# Patient Record
Sex: FEMALE | Race: OTHER | Employment: UNEMPLOYED | URBAN - METROPOLITAN AREA
[De-identification: names, ages, dates, MRNs, and addresses within clinical notes are randomized per-mention and may not be internally consistent; named-entity substitution may affect disease eponyms.]

---
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
## 2020-08-27 ENCOUNTER — OFFICE VISIT (OUTPATIENT)
Dept: FAMILY MEDICINE CLINIC | Facility: CLINIC | Age: 20
End: 2020-08-27
Payer: COMMERCIAL

## 2020-08-27 VITALS
RESPIRATION RATE: 18 BRPM | BODY MASS INDEX: 27.82 KG/M2 | TEMPERATURE: 98 F | WEIGHT: 167 LBS | HEART RATE: 78 BPM | HEIGHT: 65 IN | SYSTOLIC BLOOD PRESSURE: 104 MMHG | DIASTOLIC BLOOD PRESSURE: 76 MMHG

## 2020-08-27 DIAGNOSIS — F90.2 ATTENTION DEFICIT HYPERACTIVITY DISORDER (ADHD), COMBINED TYPE: ICD-10-CM

## 2020-08-27 DIAGNOSIS — J45.40 MODERATE PERSISTENT ASTHMA WITHOUT COMPLICATION: ICD-10-CM

## 2020-08-27 DIAGNOSIS — Z00.00 ROUTINE GENERAL MEDICAL EXAMINATION AT A HEALTH CARE FACILITY: Primary | ICD-10-CM

## 2020-08-27 DIAGNOSIS — N94.6 SEVERE MENSTRUAL CRAMPS: ICD-10-CM

## 2020-08-27 DIAGNOSIS — F41.9 ANXIETY: ICD-10-CM

## 2020-08-27 DIAGNOSIS — J30.2 SEASONAL ALLERGIC RHINITIS, UNSPECIFIED TRIGGER: ICD-10-CM

## 2020-08-27 PROBLEM — Z22.7 TB LUNG, LATENT: Status: ACTIVE | Noted: 2018-07-30

## 2020-08-27 PROBLEM — R00.2 PALPITATIONS: Status: RESOLVED | Noted: 2017-06-19 | Resolved: 2020-08-27

## 2020-08-27 PROBLEM — J38.3 VOCAL CORD DYSFUNCTION: Status: RESOLVED | Noted: 2017-05-25 | Resolved: 2020-08-27

## 2020-08-27 PROBLEM — Z87.820 HISTORY OF MULTIPLE CONCUSSIONS: Status: ACTIVE | Noted: 2018-03-25

## 2020-08-27 PROBLEM — Z86.15 PERSONAL HISTORY OF LATENT TUBERCULOSIS INFECTION: Status: ACTIVE | Noted: 2018-07-30

## 2020-08-27 PROCEDURE — 3008F BODY MASS INDEX DOCD: CPT | Performed by: PHYSICIAN ASSISTANT

## 2020-08-27 PROCEDURE — 3078F DIAST BP <80 MM HG: CPT | Performed by: PHYSICIAN ASSISTANT

## 2020-08-27 PROCEDURE — 3074F SYST BP LT 130 MM HG: CPT | Performed by: PHYSICIAN ASSISTANT

## 2020-08-27 PROCEDURE — 99385 PREV VISIT NEW AGE 18-39: CPT | Performed by: PHYSICIAN ASSISTANT

## 2020-08-27 RX ORDER — DEXTROAMPHETAMINE SACCHARATE, AMPHETAMINE ASPARTATE MONOHYDRATE, DEXTROAMPHETAMINE SULFATE AND AMPHETAMINE SULFATE 1.25; 1.25; 1.25; 1.25 MG/1; MG/1; MG/1; MG/1
5 CAPSULE, EXTENDED RELEASE ORAL DAILY
Qty: 30 CAPSULE | Refills: 0 | Status: SHIPPED | OUTPATIENT
Start: 2020-10-28 | End: 2020-11-27

## 2020-08-27 RX ORDER — DEXTROAMPHETAMINE SACCHARATE, AMPHETAMINE ASPARTATE MONOHYDRATE, DEXTROAMPHETAMINE SULFATE AND AMPHETAMINE SULFATE 1.25; 1.25; 1.25; 1.25 MG/1; MG/1; MG/1; MG/1
5 CAPSULE, EXTENDED RELEASE ORAL DAILY
Qty: 30 CAPSULE | Refills: 0 | Status: SHIPPED | OUTPATIENT
Start: 2020-08-27 | End: 2020-09-26

## 2020-08-27 RX ORDER — ALBUTEROL SULFATE 90 UG/1
2 AEROSOL, METERED RESPIRATORY (INHALATION)
Qty: 3 INHALER | Refills: 3 | Status: SHIPPED | OUTPATIENT
Start: 2020-08-27 | End: 2021-06-02

## 2020-08-27 RX ORDER — DEXTROAMPHETAMINE SACCHARATE, AMPHETAMINE ASPARTATE MONOHYDRATE, DEXTROAMPHETAMINE SULFATE AND AMPHETAMINE SULFATE 7.5; 7.5; 7.5; 7.5 MG/1; MG/1; MG/1; MG/1
30 CAPSULE, EXTENDED RELEASE ORAL DAILY
Qty: 30 CAPSULE | Refills: 0 | Status: SHIPPED | OUTPATIENT
Start: 2020-08-27 | End: 2020-09-26

## 2020-08-27 RX ORDER — DEXTROAMPHETAMINE SULFATE, DEXTROAMPHETAMINE SACCHARATE, AMPHETAMINE SULFATE AND AMPHETAMINE ASPARTATE 1.25; 1.25; 1.25; 1.25 MG/1; MG/1; MG/1; MG/1
CAPSULE, EXTENDED RELEASE ORAL
COMMUNITY
Start: 2020-03-19 | End: 2020-08-27

## 2020-08-27 RX ORDER — DEXTROAMPHETAMINE SACCHARATE, AMPHETAMINE ASPARTATE MONOHYDRATE, DEXTROAMPHETAMINE SULFATE AND AMPHETAMINE SULFATE 7.5; 7.5; 7.5; 7.5 MG/1; MG/1; MG/1; MG/1
30 CAPSULE, EXTENDED RELEASE ORAL DAILY
Qty: 30 CAPSULE | Refills: 0 | Status: SHIPPED | OUTPATIENT
Start: 2020-10-28 | End: 2020-11-27

## 2020-08-27 RX ORDER — FEXOFENADINE HCL 180 MG/1
180 TABLET ORAL DAILY
COMMUNITY

## 2020-08-27 RX ORDER — DEXTROAMPHETAMINE SACCHARATE, AMPHETAMINE ASPARTATE MONOHYDRATE, DEXTROAMPHETAMINE SULFATE AND AMPHETAMINE SULFATE 7.5; 7.5; 7.5; 7.5 MG/1; MG/1; MG/1; MG/1
CAPSULE, EXTENDED RELEASE ORAL
COMMUNITY
Start: 2020-03-19 | End: 2020-08-27

## 2020-08-27 RX ORDER — DEXTROAMPHETAMINE SACCHARATE, AMPHETAMINE ASPARTATE MONOHYDRATE, DEXTROAMPHETAMINE SULFATE AND AMPHETAMINE SULFATE 1.25; 1.25; 1.25; 1.25 MG/1; MG/1; MG/1; MG/1
5 CAPSULE, EXTENDED RELEASE ORAL DAILY
Qty: 30 CAPSULE | Refills: 0 | Status: SHIPPED | OUTPATIENT
Start: 2020-09-27 | End: 2020-10-27

## 2020-08-27 RX ORDER — DEXTROAMPHETAMINE SACCHARATE, AMPHETAMINE ASPARTATE MONOHYDRATE, DEXTROAMPHETAMINE SULFATE AND AMPHETAMINE SULFATE 7.5; 7.5; 7.5; 7.5 MG/1; MG/1; MG/1; MG/1
30 CAPSULE, EXTENDED RELEASE ORAL DAILY
Qty: 30 CAPSULE | Refills: 0 | Status: SHIPPED | OUTPATIENT
Start: 2020-09-27 | End: 2020-10-27

## 2020-08-27 NOTE — PROGRESS NOTES
HPI:    Patient ID: Leanna Duncan is a 20year old female. HPI   Patient presents today to establish care and requests physical exam. Was previously receiving care at [facility].       PMHx: problem list reviewed and reconciled  PSHx: none  FHx: reviewed mouth daily. 30 capsule 0   • [START ON 9/27/2020] Amphetamine-Dextroamphet ER (ADDERALL XR) 30 MG Oral Capsule SR 24 Hr Take 1 capsule (30 mg total) by mouth daily.  30 capsule 0   • [START ON 10/28/2020] Amphetamine-Dextroamphet ER (ADDERALL XR) 30 MG Ora Left Ear: Tympanic membrane and external ear normal.   Nose: Nose normal.   Mouth/Throat: Oropharynx is clear and moist.   Post nasal drip   Eyes: Pupils are equal, round, and reactive to light.  Conjunctivae and EOM are normal.   Neck: Normal range of mo (ADDERALL XR) 30 MG Oral Capsule SR 24 Hr; Take 1 capsule (30 mg total) by mouth daily. Dispense: 30 capsule; Refill: 0  - Amphetamine-Dextroamphet ER (ADDERALL XR) 5 MG Oral Capsule SR 24 Hr; Take 1 capsule (5 mg total) by mouth daily.   Dispense: 30 caps [E]      Meds This Visit:  Requested Prescriptions     Signed Prescriptions Disp Refills   • Amphetamine-Dextroamphet ER (ADDERALL XR) 30 MG Oral Capsule SR 24 Hr 30 capsule 0     Sig: Take 1 capsule (30 mg total) by mouth daily.    • Amphetamine-Dextroamph

## 2020-09-02 ENCOUNTER — LAB ENCOUNTER (OUTPATIENT)
Dept: LAB | Age: 20
End: 2020-09-02
Attending: PHYSICIAN ASSISTANT
Payer: COMMERCIAL

## 2020-09-02 DIAGNOSIS — Z00.00 ROUTINE GENERAL MEDICAL EXAMINATION AT A HEALTH CARE FACILITY: ICD-10-CM

## 2020-09-02 LAB
ALBUMIN SERPL-MCNC: 4.5 G/DL (ref 3.4–5)
ALBUMIN/GLOB SERPL: 1.3 {RATIO} (ref 1–2)
ALP LIVER SERPL-CCNC: 49 U/L (ref 52–144)
ALT SERPL-CCNC: 15 U/L (ref 13–56)
ANION GAP SERPL CALC-SCNC: 9 MMOL/L (ref 0–18)
AST SERPL-CCNC: 15 U/L (ref 15–37)
BASOPHILS # BLD AUTO: 0.03 X10(3) UL (ref 0–0.2)
BASOPHILS NFR BLD AUTO: 0.5 %
BILIRUB SERPL-MCNC: 0.8 MG/DL (ref 0.1–2)
BUN BLD-MCNC: 12 MG/DL (ref 7–18)
BUN/CREAT SERPL: 12.4 (ref 10–20)
CALCIUM BLD-MCNC: 9.8 MG/DL (ref 8.5–10.1)
CHLORIDE SERPL-SCNC: 106 MMOL/L (ref 98–112)
CHOLEST SMN-MCNC: 192 MG/DL (ref ?–200)
CO2 SERPL-SCNC: 22 MMOL/L (ref 21–32)
CREAT BLD-MCNC: 0.97 MG/DL (ref 0.55–1.02)
DEPRECATED RDW RBC AUTO: 45.7 FL (ref 35.1–46.3)
EOSINOPHIL # BLD AUTO: 0.09 X10(3) UL (ref 0–0.7)
EOSINOPHIL NFR BLD AUTO: 1.5 %
ERYTHROCYTE [DISTWIDTH] IN BLOOD BY AUTOMATED COUNT: 13.3 % (ref 11–15)
GLOBULIN PLAS-MCNC: 3.5 G/DL (ref 2.8–4.4)
GLUCOSE BLD-MCNC: 88 MG/DL (ref 70–99)
HCT VFR BLD AUTO: 43.6 % (ref 35–48)
HDLC SERPL-MCNC: 72 MG/DL (ref 40–59)
HGB BLD-MCNC: 13.8 G/DL (ref 12–16)
IMM GRANULOCYTES # BLD AUTO: 0.01 X10(3) UL (ref 0–1)
IMM GRANULOCYTES NFR BLD: 0.2 %
LDLC SERPL CALC-MCNC: 106 MG/DL (ref ?–100)
LYMPHOCYTES # BLD AUTO: 1.33 X10(3) UL (ref 1–4)
LYMPHOCYTES NFR BLD AUTO: 22.5 %
M PROTEIN MFR SERPL ELPH: 8 G/DL (ref 6.4–8.2)
MCH RBC QN AUTO: 29.1 PG (ref 26–34)
MCHC RBC AUTO-ENTMCNC: 31.7 G/DL (ref 31–37)
MCV RBC AUTO: 91.8 FL (ref 80–100)
MONOCYTES # BLD AUTO: 0.48 X10(3) UL (ref 0.1–1)
MONOCYTES NFR BLD AUTO: 8.1 %
NEUTROPHILS # BLD AUTO: 3.98 X10 (3) UL (ref 1.5–7.7)
NEUTROPHILS # BLD AUTO: 3.98 X10(3) UL (ref 1.5–7.7)
NEUTROPHILS NFR BLD AUTO: 67.2 %
NONHDLC SERPL-MCNC: 120 MG/DL (ref ?–130)
OSMOLALITY SERPL CALC.SUM OF ELEC: 283 MOSM/KG (ref 275–295)
PATIENT FASTING Y/N/NP: YES
PATIENT FASTING Y/N/NP: YES
PLATELET # BLD AUTO: 257 10(3)UL (ref 150–450)
POTASSIUM SERPL-SCNC: 4.3 MMOL/L (ref 3.5–5.1)
RBC # BLD AUTO: 4.75 X10(6)UL (ref 3.8–5.3)
SARS-COV-2 IGG SERPLBLD QL IA.RAPID: NEGATIVE
SODIUM SERPL-SCNC: 137 MMOL/L (ref 136–145)
TRIGL SERPL-MCNC: 68 MG/DL (ref 30–149)
TSI SER-ACNC: 1.45 MIU/ML (ref 0.36–3.74)
VIT B12 SERPL-MCNC: 384 PG/ML (ref 193–986)
VIT D+METAB SERPL-MCNC: 9 NG/ML (ref 30–100)
VLDLC SERPL CALC-MCNC: 14 MG/DL (ref 0–30)
WBC # BLD AUTO: 5.9 X10(3) UL (ref 4–11)

## 2020-09-02 PROCEDURE — 86769 SARS-COV-2 COVID-19 ANTIBODY: CPT | Performed by: PHYSICIAN ASSISTANT

## 2020-09-02 PROCEDURE — 82306 VITAMIN D 25 HYDROXY: CPT | Performed by: PHYSICIAN ASSISTANT

## 2020-09-02 PROCEDURE — 80061 LIPID PANEL: CPT | Performed by: PHYSICIAN ASSISTANT

## 2020-09-02 PROCEDURE — 80050 GENERAL HEALTH PANEL: CPT | Performed by: PHYSICIAN ASSISTANT

## 2020-09-02 PROCEDURE — 82607 VITAMIN B-12: CPT | Performed by: PHYSICIAN ASSISTANT

## 2020-09-02 PROCEDURE — 36415 COLL VENOUS BLD VENIPUNCTURE: CPT | Performed by: PHYSICIAN ASSISTANT

## 2020-10-04 ENCOUNTER — HOSPITAL ENCOUNTER (OUTPATIENT)
Age: 20
Discharge: HOME | End: 2020-10-04
Payer: COMMERCIAL

## 2020-10-04 ENCOUNTER — APPOINTMENT (OUTPATIENT)
Dept: GENERAL RADIOLOGY | Age: 20
End: 2020-10-04
Attending: PHYSICIAN ASSISTANT
Payer: COMMERCIAL

## 2020-10-04 VITALS
DIASTOLIC BLOOD PRESSURE: 74 MMHG | HEIGHT: 67 IN | HEART RATE: 74 BPM | WEIGHT: 160 LBS | BODY MASS INDEX: 25.11 KG/M2 | RESPIRATION RATE: 16 BRPM | OXYGEN SATURATION: 96 % | SYSTOLIC BLOOD PRESSURE: 113 MMHG | TEMPERATURE: 98 F

## 2020-10-04 DIAGNOSIS — B34.9 VIRAL SYNDROME: Primary | ICD-10-CM

## 2020-10-04 PROCEDURE — 71046 X-RAY EXAM CHEST 2 VIEWS: CPT | Performed by: PHYSICIAN ASSISTANT

## 2020-10-04 PROCEDURE — 99213 OFFICE O/P EST LOW 20 MIN: CPT | Performed by: PHYSICIAN ASSISTANT

## 2020-10-04 PROCEDURE — U0003 INFECTIOUS AGENT DETECTION BY NUCLEIC ACID (DNA OR RNA); SEVERE ACUTE RESPIRATORY SYNDROME CORONAVIRUS 2 (SARS-COV-2) (CORONAVIRUS DISEASE [COVID-19]), AMPLIFIED PROBE TECHNIQUE, MAKING USE OF HIGH THROUGHPUT TECHNOLOGIES AS DESCRIBED BY CMS-2020-01-R: HCPCS | Performed by: PHYSICIAN ASSISTANT

## 2020-10-04 RX ORDER — MELATONIN 10 MG
CAPSULE ORAL
COMMUNITY

## 2020-10-04 NOTE — ED PROVIDER NOTES
Patient Seen in: [facility]      History   Patient presents with:  Testing    Stated Complaint: TL - cough, diarrhea, congested, fever; hx of asthma X4 days    HPI    CHIEF COMPLAINT: Cough, congestion and diarrhea for the Alcohol/week: 0.0 standard drinks    Drug use: No             Review of Systems    Positive for stated complaint: TL - cough, diarrhea, congested, fever; hx of asthma X4 days  Other systems are as noted in HPI. Constitutional and vital signs reviewed. XR CHEST PA + LAT CHEST (CPT=71046)  INDICATIONS:  TL - cough, diarrhea, congested, fever; hx of asthma X4 days  COMPARISON:  [facility] , XR, XR CHEST PA + LAT CHEST (OBR=92114), 5/25/2013, 10:09 AM.  TECHNIQUE:  PA and lateral chest radiographs were obtained

## 2020-10-04 NOTE — ED INITIAL ASSESSMENT (HPI)
For the past 4 days, c/o dry cough/wheezing, diarrhea (once per day), sinus congestion, and low grade fevers. Using Albuterol MDI with minimal relief.

## 2020-10-09 ENCOUNTER — APPOINTMENT (OUTPATIENT)
Dept: GENERAL RADIOLOGY | Age: 20
End: 2020-10-09
Attending: NURSE PRACTITIONER
Payer: COMMERCIAL

## 2020-10-09 ENCOUNTER — HOSPITAL ENCOUNTER (OUTPATIENT)
Age: 20
Discharge: HOME | End: 2020-10-09
Payer: COMMERCIAL

## 2020-10-09 VITALS
HEIGHT: 67 IN | RESPIRATION RATE: 18 BRPM | BODY MASS INDEX: 25.11 KG/M2 | HEART RATE: 72 BPM | DIASTOLIC BLOOD PRESSURE: 61 MMHG | WEIGHT: 160 LBS | TEMPERATURE: 99 F | SYSTOLIC BLOOD PRESSURE: 102 MMHG | OXYGEN SATURATION: 98 %

## 2020-10-09 DIAGNOSIS — S29.011A STRAIN OF CHEST WALL, INITIAL ENCOUNTER: ICD-10-CM

## 2020-10-09 DIAGNOSIS — S46.911A SHOULDER STRAIN, RIGHT, INITIAL ENCOUNTER: Primary | ICD-10-CM

## 2020-10-09 PROCEDURE — 99213 OFFICE O/P EST LOW 20 MIN: CPT | Performed by: NURSE PRACTITIONER

## 2020-10-09 PROCEDURE — 73030 X-RAY EXAM OF SHOULDER: CPT | Performed by: NURSE PRACTITIONER

## 2020-10-09 PROCEDURE — 73000 X-RAY EXAM OF COLLAR BONE: CPT | Performed by: NURSE PRACTITIONER

## 2020-10-09 NOTE — ED PROVIDER NOTES
Patient Seen in: [facility]      History   Patient presents with:  Chest Pain Angina    Stated Complaint: chest and arm pain x 3days    79-year-old female with no with history of asthma presents the immediate care with rig Constitutional:       Appearance: Normal appearance. HENT:      Head: Normocephalic. Nose: Nose normal.      Mouth/Throat:      Mouth: Mucous membranes are moist.   Eyes:      Extraocular Movements: Extraocular movements intact.    Cardiovascular: [phone]                Medications Prescribed:  Discharge Medication List as of 10/9/2020  7:00 PM

## 2020-10-27 ENCOUNTER — NURSE ONLY (OUTPATIENT)
Dept: FAMILY MEDICINE CLINIC | Facility: CLINIC | Age: 20
End: 2020-10-27
Payer: COMMERCIAL

## 2020-10-27 PROCEDURE — 90686 IIV4 VACC NO PRSV 0.5 ML IM: CPT | Performed by: FAMILY MEDICINE

## 2020-10-27 PROCEDURE — 90471 IMMUNIZATION ADMIN: CPT | Performed by: FAMILY MEDICINE

## 2020-11-06 ENCOUNTER — OFFICE VISIT (OUTPATIENT)
Dept: FAMILY MEDICINE CLINIC | Facility: CLINIC | Age: 20
End: 2020-11-06
Payer: COMMERCIAL

## 2020-11-06 VITALS
HEIGHT: 67 IN | OXYGEN SATURATION: 98 % | DIASTOLIC BLOOD PRESSURE: 82 MMHG | WEIGHT: 165 LBS | TEMPERATURE: 98 F | SYSTOLIC BLOOD PRESSURE: 120 MMHG | RESPIRATION RATE: 18 BRPM | HEART RATE: 102 BPM | BODY MASS INDEX: 25.9 KG/M2

## 2020-11-06 DIAGNOSIS — R22.0 SWELLING OF UPPER LIP: Primary | ICD-10-CM

## 2020-11-06 PROCEDURE — 3074F SYST BP LT 130 MM HG: CPT | Performed by: NURSE PRACTITIONER

## 2020-11-06 PROCEDURE — 99212 OFFICE O/P EST SF 10 MIN: CPT | Performed by: NURSE PRACTITIONER

## 2020-11-06 PROCEDURE — 99072 ADDL SUPL MATRL&STAF TM PHE: CPT | Performed by: NURSE PRACTITIONER

## 2020-11-06 PROCEDURE — 3079F DIAST BP 80-89 MM HG: CPT | Performed by: NURSE PRACTITIONER

## 2020-11-06 PROCEDURE — 3008F BODY MASS INDEX DOCD: CPT | Performed by: NURSE PRACTITIONER

## 2020-11-06 NOTE — PROGRESS NOTES
Patient presents with:  Mouth/Lip Problem: swollen top lip, dental appt yesterday       HPI:    Greg Johns is a 23year old adult presents with inflammation, slight erythema, and small umbilicated vesicles with tenderness to palpation over upper lip. Date   • ASTHMA    • ECZEMA         No past surgical history on file.    Family History   Problem Relation Age of Onset   • Asthma Other    • Diabetes Maternal Grandmother    • High Cholesterol Maternal Grandmother    • Depression Maternal Grandmother    • prescriptions requested or ordered in this encounter       Imaging & Consults:  None    Skin care d/w the pt. Continue with Dentist recommendations to use Allegra, ice packs, Ibuprofen PRN, keeping lips moist with Vaseline.   The patient indicates [place]

## 2020-11-06 NOTE — PATIENT INSTRUCTIONS
Take Ibuprofen as needed for pain and swelling. Per patient's dentist, continue with Allegra, ice packs and keep lip lubricated with Vaseline.

## 2021-03-08 ENCOUNTER — OFFICE VISIT (OUTPATIENT)
Dept: FAMILY MEDICINE CLINIC | Facility: CLINIC | Age: 21
End: 2021-03-08
Payer: COMMERCIAL

## 2021-03-08 VITALS
DIASTOLIC BLOOD PRESSURE: 76 MMHG | RESPIRATION RATE: 16 BRPM | WEIGHT: 180 LBS | HEART RATE: 74 BPM | HEIGHT: 67 IN | BODY MASS INDEX: 28.25 KG/M2 | OXYGEN SATURATION: 98 % | SYSTOLIC BLOOD PRESSURE: 116 MMHG

## 2021-03-08 DIAGNOSIS — L21.9 SEBORRHEIC DERMATITIS: Primary | ICD-10-CM

## 2021-03-08 PROCEDURE — 3074F SYST BP LT 130 MM HG: CPT | Performed by: PHYSICIAN ASSISTANT

## 2021-03-08 PROCEDURE — 3008F BODY MASS INDEX DOCD: CPT | Performed by: PHYSICIAN ASSISTANT

## 2021-03-08 PROCEDURE — 3078F DIAST BP <80 MM HG: CPT | Performed by: PHYSICIAN ASSISTANT

## 2021-03-08 PROCEDURE — 99213 OFFICE O/P EST LOW 20 MIN: CPT | Performed by: PHYSICIAN ASSISTANT

## 2021-03-08 RX ORDER — EPINEPHRINE 0.3 MG/.3ML
INJECTION SUBCUTANEOUS
COMMUNITY
Start: 2021-02-17

## 2021-03-08 NOTE — PROGRESS NOTES
HPI/Subjective:   Patient ID: Sybil Abrams is a 23year old adult. HPI   Patient presents today for evaluation of the skin of the left upper eyelid. For the last few days there has been a patch of erythema, dryness, and cracking.   She has been applyin for Wheezing. 3 Inhaler 3   • Diclofenac Sodium 50 MG Oral Tab EC Take 1 tablet (50 mg total) by mouth 2 (two) times daily.  30 tablet 3   • albuterol sulfate (2.5 MG/3ML) 0.083% Inhalation Nebu Soln Take 3 mL (2.5 mg total) by nebulization every 4 (four) h types were placed in this encounter.       Meds This Visit:  Requested Prescriptions      No prescriptions requested or ordered in this encounter       Imaging & Referrals:  None

## 2021-05-04 ENCOUNTER — HOSPITAL ENCOUNTER (EMERGENCY)
Facility: HOSPITAL | Age: 21
Discharge: HOME | End: 2021-05-05
Attending: EMERGENCY MEDICINE
Payer: COMMERCIAL

## 2021-05-04 DIAGNOSIS — T78.40XA ALLERGIC REACTION, INITIAL ENCOUNTER: Primary | ICD-10-CM

## 2021-05-04 PROCEDURE — S0028 INJECTION, FAMOTIDINE, 20 MG: HCPCS | Performed by: EMERGENCY MEDICINE

## 2021-05-04 PROCEDURE — 99284 EMERGENCY DEPT VISIT MOD MDM: CPT

## 2021-05-04 PROCEDURE — 96374 THER/PROPH/DIAG INJ IV PUSH: CPT

## 2021-05-04 PROCEDURE — 96375 TX/PRO/DX INJ NEW DRUG ADDON: CPT

## 2021-05-04 RX ORDER — FAMOTIDINE 10 MG/ML
20 INJECTION, SOLUTION INTRAVENOUS ONCE
Status: COMPLETED | OUTPATIENT
Start: 2021-05-04 | End: 2021-05-04

## 2021-05-04 RX ORDER — METHYLPREDNISOLONE SODIUM SUCCINATE 125 MG/2ML
125 INJECTION, POWDER, LYOPHILIZED, FOR SOLUTION INTRAMUSCULAR; INTRAVENOUS ONCE
Status: COMPLETED | OUTPATIENT
Start: 2021-05-04 | End: 2021-05-04

## 2021-05-04 RX ORDER — DIPHENHYDRAMINE HYDROCHLORIDE 50 MG/ML
50 INJECTION INTRAMUSCULAR; INTRAVENOUS ONCE
Status: COMPLETED | OUTPATIENT
Start: 2021-05-04 | End: 2021-05-04

## 2021-05-05 ENCOUNTER — LAB ENCOUNTER (OUTPATIENT)
Dept: LAB | Age: 21
End: 2021-05-05
Attending: PHYSICIAN ASSISTANT
Payer: COMMERCIAL

## 2021-05-05 ENCOUNTER — OFFICE VISIT (OUTPATIENT)
Dept: FAMILY MEDICINE CLINIC | Facility: CLINIC | Age: 21
End: 2021-05-05
Payer: COMMERCIAL

## 2021-05-05 ENCOUNTER — HOSPITAL ENCOUNTER (EMERGENCY)
Facility: HOSPITAL | Age: 21
Discharge: HOME | End: 2021-05-06
Attending: EMERGENCY MEDICINE
Payer: COMMERCIAL

## 2021-05-05 ENCOUNTER — APPOINTMENT (OUTPATIENT)
Dept: GENERAL RADIOLOGY | Facility: HOSPITAL | Age: 21
End: 2021-05-05
Attending: EMERGENCY MEDICINE
Payer: COMMERCIAL

## 2021-05-05 VITALS
SYSTOLIC BLOOD PRESSURE: 124 MMHG | OXYGEN SATURATION: 99 % | HEART RATE: 82 BPM | DIASTOLIC BLOOD PRESSURE: 71 MMHG | WEIGHT: 168 LBS | RESPIRATION RATE: 19 BRPM | BODY MASS INDEX: 26.37 KG/M2 | TEMPERATURE: 98 F | HEIGHT: 67 IN

## 2021-05-05 VITALS
HEIGHT: 67 IN | RESPIRATION RATE: 18 BRPM | SYSTOLIC BLOOD PRESSURE: 114 MMHG | BODY MASS INDEX: 27.31 KG/M2 | OXYGEN SATURATION: 97 % | HEART RATE: 80 BPM | WEIGHT: 174 LBS | DIASTOLIC BLOOD PRESSURE: 68 MMHG | TEMPERATURE: 98 F

## 2021-05-05 DIAGNOSIS — E55.9 VITAMIN D DEFICIENCY: ICD-10-CM

## 2021-05-05 DIAGNOSIS — R07.89 CHEST PAIN, ATYPICAL: Primary | ICD-10-CM

## 2021-05-05 DIAGNOSIS — T78.40XD ALLERGIC REACTION, SUBSEQUENT ENCOUNTER: Primary | ICD-10-CM

## 2021-05-05 PROCEDURE — 82306 VITAMIN D 25 HYDROXY: CPT | Performed by: PHYSICIAN ASSISTANT

## 2021-05-05 PROCEDURE — 3074F SYST BP LT 130 MM HG: CPT | Performed by: PHYSICIAN ASSISTANT

## 2021-05-05 PROCEDURE — 80053 COMPREHEN METABOLIC PANEL: CPT | Performed by: EMERGENCY MEDICINE

## 2021-05-05 PROCEDURE — 93010 ELECTROCARDIOGRAM REPORT: CPT

## 2021-05-05 PROCEDURE — 36415 COLL VENOUS BLD VENIPUNCTURE: CPT

## 2021-05-05 PROCEDURE — 99284 EMERGENCY DEPT VISIT MOD MDM: CPT

## 2021-05-05 PROCEDURE — 85379 FIBRIN DEGRADATION QUANT: CPT | Performed by: EMERGENCY MEDICINE

## 2021-05-05 PROCEDURE — 84484 ASSAY OF TROPONIN QUANT: CPT | Performed by: EMERGENCY MEDICINE

## 2021-05-05 PROCEDURE — 71045 X-RAY EXAM CHEST 1 VIEW: CPT | Performed by: EMERGENCY MEDICINE

## 2021-05-05 PROCEDURE — 3008F BODY MASS INDEX DOCD: CPT | Performed by: PHYSICIAN ASSISTANT

## 2021-05-05 PROCEDURE — 85025 COMPLETE CBC W/AUTO DIFF WBC: CPT | Performed by: PHYSICIAN ASSISTANT

## 2021-05-05 PROCEDURE — 85025 COMPLETE CBC W/AUTO DIFF WBC: CPT | Performed by: EMERGENCY MEDICINE

## 2021-05-05 PROCEDURE — 3078F DIAST BP <80 MM HG: CPT | Performed by: PHYSICIAN ASSISTANT

## 2021-05-05 PROCEDURE — 93005 ELECTROCARDIOGRAM TRACING: CPT

## 2021-05-05 PROCEDURE — 99214 OFFICE O/P EST MOD 30 MIN: CPT | Performed by: PHYSICIAN ASSISTANT

## 2021-05-05 RX ORDER — PREDNISONE 20 MG/1
40 TABLET ORAL DAILY
Qty: 10 TABLET | Refills: 0 | Status: SHIPPED | OUTPATIENT
Start: 2021-05-05 | End: 2021-05-10

## 2021-05-06 VITALS
BODY MASS INDEX: 27.94 KG/M2 | HEIGHT: 67 IN | SYSTOLIC BLOOD PRESSURE: 108 MMHG | WEIGHT: 178 LBS | DIASTOLIC BLOOD PRESSURE: 74 MMHG | OXYGEN SATURATION: 98 % | TEMPERATURE: 98 F | HEART RATE: 84 BPM | RESPIRATION RATE: 23 BRPM

## 2021-05-06 NOTE — ED INITIAL ASSESSMENT (HPI)
Pt presents to ED for chest pressure generalized chest area and pain to mid sternum also pressure to all limbs. Pt states was in ED for allergic reaction yesterday. Denies JUS.

## 2021-05-06 NOTE — ED PROVIDER NOTES
Patient Seen in: [facility] Emergency Department      History   Patient presents with:  Pain    Stated Complaint: Was here last night for allergic reaction and now having weird sensation in her*    HPI/Subjective:   HPI    Patient is a 13-year-old f None (Room air)       Current:/74   Pulse 84   Temp 98.2 °F (36.8 °C) (Temporal)   Resp 23   Ht 170.2 cm (5' 7\")   Wt 80.7 kg   LMP 05/04/2021   SpO2 98%   BMI 27.88 kg/m²         Physical Exam    GENERAL: No acute distress, well appearing and non-t DIFFERENTIAL[[id number]]          Abnormal            Final result                 Please view results for these tests on the individual orders.    RAINBOW DRAW LAVENDER   RAINBOW DRAW LIGHT GREEN   RAINBOW DRAW GOLD     EKG    Rate, intervals and axes as no follow-up with her primary physician. EKG without acute changes. Normal troponin and D-dimer. Chest x-ray shows no evidence for pneumothorax or pneumonia. EKG without signs of pericarditis. Patient was screened and evaluated during this visit.    As

## 2021-05-09 ENCOUNTER — PATIENT MESSAGE (OUTPATIENT)
Dept: FAMILY MEDICINE CLINIC | Facility: CLINIC | Age: 21
End: 2021-05-09

## 2021-05-10 ENCOUNTER — PATIENT MESSAGE (OUTPATIENT)
Dept: FAMILY MEDICINE CLINIC | Facility: CLINIC | Age: 21
End: 2021-05-10

## 2021-05-10 ENCOUNTER — OFFICE VISIT (OUTPATIENT)
Dept: FAMILY MEDICINE CLINIC | Facility: CLINIC | Age: 21
End: 2021-05-10
Payer: COMMERCIAL

## 2021-05-10 ENCOUNTER — LAB ENCOUNTER (OUTPATIENT)
Dept: LAB | Age: 21
End: 2021-05-10
Attending: PHYSICIAN ASSISTANT
Payer: COMMERCIAL

## 2021-05-10 VITALS
DIASTOLIC BLOOD PRESSURE: 90 MMHG | HEART RATE: 78 BPM | OXYGEN SATURATION: 98 % | HEIGHT: 67 IN | BODY MASS INDEX: 27.94 KG/M2 | SYSTOLIC BLOOD PRESSURE: 112 MMHG | WEIGHT: 178 LBS | RESPIRATION RATE: 16 BRPM

## 2021-05-10 DIAGNOSIS — R59.9 GLANDS SWOLLEN: ICD-10-CM

## 2021-05-10 DIAGNOSIS — R06.02 SHORTNESS OF BREATH: ICD-10-CM

## 2021-05-10 DIAGNOSIS — M54.2 NECK PAIN: ICD-10-CM

## 2021-05-10 DIAGNOSIS — Z20.822 SUSPECTED COVID-19 VIRUS INFECTION: ICD-10-CM

## 2021-05-10 DIAGNOSIS — R07.89 CHEST WALL TENDERNESS: ICD-10-CM

## 2021-05-10 DIAGNOSIS — R07.9 CHEST PAIN, UNSPECIFIED TYPE: ICD-10-CM

## 2021-05-10 DIAGNOSIS — R07.9 CHEST PAIN, UNSPECIFIED TYPE: Primary | ICD-10-CM

## 2021-05-10 DIAGNOSIS — R19.7 DIARRHEA, UNSPECIFIED TYPE: ICD-10-CM

## 2021-05-10 PROCEDURE — 3080F DIAST BP >= 90 MM HG: CPT | Performed by: PHYSICIAN ASSISTANT

## 2021-05-10 PROCEDURE — 3074F SYST BP LT 130 MM HG: CPT | Performed by: PHYSICIAN ASSISTANT

## 2021-05-10 PROCEDURE — 99214 OFFICE O/P EST MOD 30 MIN: CPT | Performed by: PHYSICIAN ASSISTANT

## 2021-05-10 PROCEDURE — 3008F BODY MASS INDEX DOCD: CPT | Performed by: PHYSICIAN ASSISTANT

## 2021-05-10 NOTE — TELEPHONE ENCOUNTER
Spoke to pt she states pain from rib cage up to neck. Pt seen in ER last week for different sx. Pt states it hurts to touch and painful to swallow. Pt requesting to see LR today advised no available appointments. Pt verbalized understanding and states she will

## 2021-05-10 NOTE — PROGRESS NOTES
HPI/Subjective:   Patient ID: Sd Guo is a 23year old adult. HPI   Patient presents accompanied by her mother in follow up to last visit on 5/5/2021. At that time she was seen for f/u of presumed allergic reaction.   She was continued on prednison Musculoskeletal: Positive for back pain and myalgias. Negative for arthralgias and gait problem. Skin: Negative for rash. Neurological: Negative for weakness, light-headedness and headaches. Hematological: Positive for adenopathy.    Psychiatric/Beh lungs daily. 1 each 11   • Mometasone Furoate (ELOCON) 0.1 % External Cream Apply sparingly to affected areas twice daily 45 g 2   • predniSONE 20 MG Oral Tab Take 2 tablets (40 mg total) by mouth daily for 5 days.  (Patient not taking: Reported on 5/10/202 supple. Lymphadenopathy:      Head:      Right side of head: Submandibular adenopathy present. Cervical: Cervical adenopathy present. Left cervical: Posterior cervical adenopathy present. Skin:     General: Skin is warm and dry.       [place]illar

## 2021-06-18 ENCOUNTER — HOSPITAL ENCOUNTER (EMERGENCY)
Facility: HOSPITAL | Age: 21
Discharge: HOME | End: 2021-06-19
Attending: EMERGENCY MEDICINE
Payer: OTHER MISCELLANEOUS

## 2021-06-18 VITALS
OXYGEN SATURATION: 100 % | SYSTOLIC BLOOD PRESSURE: 124 MMHG | HEIGHT: 67 IN | HEART RATE: 98 BPM | DIASTOLIC BLOOD PRESSURE: 90 MMHG | TEMPERATURE: 99 F | BODY MASS INDEX: 26.68 KG/M2 | RESPIRATION RATE: 16 BRPM | WEIGHT: 170 LBS

## 2021-06-18 DIAGNOSIS — S61.211A LACERATION OF LEFT INDEX FINGER WITHOUT FOREIGN BODY WITHOUT DAMAGE TO NAIL, INITIAL ENCOUNTER: Primary | ICD-10-CM

## 2021-06-18 PROCEDURE — 99282 EMERGENCY DEPT VISIT SF MDM: CPT

## 2021-06-18 PROCEDURE — 12001 RPR S/N/AX/GEN/TRNK 2.5CM/<: CPT

## 2021-06-18 PROCEDURE — 99283 EMERGENCY DEPT VISIT LOW MDM: CPT

## 2021-06-19 NOTE — ED INITIAL ASSESSMENT (HPI)
Patient here with c/o left second digit laceration while at work. Patient states she was trying to catch a knife. Patient reports tetanus shot up to date.

## 2021-06-19 NOTE — ED QUICK NOTES
Patient discharged to home, Follow-up with PCP/occ health discussed. Patient AOx3 with no signs of acute distress.

## 2021-06-19 NOTE — ED PROVIDER NOTES
Patient Seen in: [facility] Emergency Department      History   Patient presents with:  Laceration/Abrasion    Stated Complaint: Laceration to Left Index Finger Sustained at work @ 1800.  Employer: [employer]*    HPI/Subjective:   HPI    Patient is deformity. Normal gait. No bony tenderness of the left hand or index finger. There is normal range of motion of the left index finger at the DIP, PIP and MCP.   Skin: There is a small less than 0.5 cm curvilinear flap, at the left index fingertip, that i

## 2021-06-21 ENCOUNTER — OFFICE VISIT (OUTPATIENT)
Dept: FAMILY MEDICINE CLINIC | Facility: CLINIC | Age: 21
End: 2021-06-21
Payer: COMMERCIAL

## 2021-06-21 VITALS
DIASTOLIC BLOOD PRESSURE: 78 MMHG | HEART RATE: 89 BPM | SYSTOLIC BLOOD PRESSURE: 120 MMHG | OXYGEN SATURATION: 98 % | RESPIRATION RATE: 16 BRPM

## 2021-06-21 DIAGNOSIS — S61.211D LACERATION OF LEFT INDEX FINGER WITHOUT FOREIGN BODY WITHOUT DAMAGE TO NAIL, SUBSEQUENT ENCOUNTER: Primary | ICD-10-CM

## 2021-06-21 DIAGNOSIS — L30.9 DERMATITIS: ICD-10-CM

## 2021-06-21 PROCEDURE — 99214 OFFICE O/P EST MOD 30 MIN: CPT | Performed by: PHYSICIAN ASSISTANT

## 2021-06-21 PROCEDURE — 3078F DIAST BP <80 MM HG: CPT | Performed by: PHYSICIAN ASSISTANT

## 2021-06-21 PROCEDURE — 3074F SYST BP LT 130 MM HG: CPT | Performed by: PHYSICIAN ASSISTANT

## 2021-06-21 RX ORDER — KETOCONAZOLE 20 MG/G
CREAM TOPICAL
Qty: 30 G | Refills: 1 | Status: SHIPPED | OUTPATIENT
Start: 2021-06-21 | End: 2022-01-11

## 2021-06-21 RX ORDER — TRIAMCINOLONE ACETONIDE 5 MG/G
1 CREAM TOPICAL 3 TIMES DAILY
Qty: 30 G | Refills: 1 | Status: SHIPPED | OUTPATIENT
Start: 2021-06-21 | End: 2022-01-11

## 2021-06-21 NOTE — PROGRESS NOTES
HPI/Subjective:   Patient ID: Heriberto Lima is a 23year old adult. HPI   Patient presents today for follow-up of left index finger abrasion that occurred 3 nights ago at work.   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 3 each 3   • Melatonin 10 MG Oral Cap Take by mouth. • Cholecalciferol (VITAMIN D3) 250 MCG (72450 UT) Oral Cap Take by mouth.      • Fexofenadine HCl 180 MG Oral Ta intact. 2. Dermatitis  Patient has been treating for presumed fungal infection with Lotrisone for 3 months. No significant improvement and in fact her rash has continued to spread. We could consider other etiologies such as eczema versus psoriasis.   Conchita Main

## 2021-07-23 ENCOUNTER — OFFICE VISIT (OUTPATIENT)
Dept: FAMILY MEDICINE CLINIC | Facility: CLINIC | Age: 21
End: 2021-07-23
Payer: COMMERCIAL

## 2021-07-23 VITALS
HEART RATE: 80 BPM | WEIGHT: 161 LBS | SYSTOLIC BLOOD PRESSURE: 130 MMHG | HEIGHT: 67 IN | RESPIRATION RATE: 16 BRPM | BODY MASS INDEX: 25.27 KG/M2 | OXYGEN SATURATION: 98 % | DIASTOLIC BLOOD PRESSURE: 92 MMHG

## 2021-07-23 DIAGNOSIS — L30.9 DERMATITIS: Primary | ICD-10-CM

## 2021-07-23 PROCEDURE — 3008F BODY MASS INDEX DOCD: CPT | Performed by: FAMILY MEDICINE

## 2021-07-23 PROCEDURE — 99213 OFFICE O/P EST LOW 20 MIN: CPT | Performed by: FAMILY MEDICINE

## 2021-07-23 PROCEDURE — 3080F DIAST BP >= 90 MM HG: CPT | Performed by: FAMILY MEDICINE

## 2021-07-23 PROCEDURE — 3075F SYST BP GE 130 - 139MM HG: CPT | Performed by: FAMILY MEDICINE

## 2021-07-23 RX ORDER — CLOBETASOL PROPIONATE 0.5 MG/G
1 OINTMENT TOPICAL 2 TIMES DAILY
Qty: 60 G | Refills: 1 | Status: SHIPPED | OUTPATIENT
Start: 2021-07-23 | End: 2021-08-12 | Stop reason: ALTCHOICE

## 2021-07-23 NOTE — PROGRESS NOTES
[facility] Progress Note    SUBJECTIVE: Torin Steele 23year old female is here today for Patient presents with:  Derm Problem       Has had patch of red sin, on hand, used a cream on it, and then spread ebyond that point and is on two fingers o EPINEPHrine 0.3 MG/0.3ML Injection Solution Auto-injector      • Cyanocobalamin (VITAMIN B 12) 100 MCG Oral Lozenge Take by mouth.      • Fluticasone Furoate-Vilanterol (BREO ELLIPTA) 200-25 MCG/INH Inhalation Aerosol Powder, Breath Activated Inhale 1 puff

## 2021-07-28 ENCOUNTER — TELEPHONE (OUTPATIENT)
Dept: FAMILY MEDICINE CLINIC | Facility: CLINIC | Age: 21
End: 2021-07-28

## 2021-07-28 NOTE — TELEPHONE ENCOUNTER
PA needed for Mupirocin Calcium (BACTROBAN) 2 % External CreamMupirocin Calcium (BACTROBAN) 2 % External Cream

## 2021-08-12 ENCOUNTER — HOSPITAL ENCOUNTER (OUTPATIENT)
Dept: GENERAL RADIOLOGY | Age: 21
Discharge: HOME | End: 2021-08-12
Attending: PHYSICIAN ASSISTANT
Payer: COMMERCIAL

## 2021-08-12 ENCOUNTER — OFFICE VISIT (OUTPATIENT)
Dept: FAMILY MEDICINE CLINIC | Facility: CLINIC | Age: 21
End: 2021-08-12
Payer: COMMERCIAL

## 2021-08-12 VITALS
WEIGHT: 156 LBS | HEART RATE: 56 BPM | RESPIRATION RATE: 18 BRPM | OXYGEN SATURATION: 98 % | DIASTOLIC BLOOD PRESSURE: 70 MMHG | TEMPERATURE: 98 F | BODY MASS INDEX: 24.48 KG/M2 | HEIGHT: 67 IN | SYSTOLIC BLOOD PRESSURE: 102 MMHG

## 2021-08-12 DIAGNOSIS — F41.9 ANXIETY: ICD-10-CM

## 2021-08-12 DIAGNOSIS — K52.9 GASTROENTERITIS: ICD-10-CM

## 2021-08-12 DIAGNOSIS — M25.561 CHRONIC PAIN OF BOTH KNEES: ICD-10-CM

## 2021-08-12 DIAGNOSIS — M25.562 CHRONIC PAIN OF BOTH KNEES: ICD-10-CM

## 2021-08-12 DIAGNOSIS — J45.40 MODERATE PERSISTENT ASTHMA WITHOUT COMPLICATION: ICD-10-CM

## 2021-08-12 DIAGNOSIS — G89.29 CHRONIC PAIN OF BOTH KNEES: ICD-10-CM

## 2021-08-12 DIAGNOSIS — F90.2 ATTENTION DEFICIT HYPERACTIVITY DISORDER (ADHD), COMBINED TYPE: ICD-10-CM

## 2021-08-12 DIAGNOSIS — Z00.00 ROUTINE GENERAL MEDICAL EXAMINATION AT A HEALTH CARE FACILITY: Primary | ICD-10-CM

## 2021-08-12 PROCEDURE — 73562 X-RAY EXAM OF KNEE 3: CPT | Performed by: PHYSICIAN ASSISTANT

## 2021-08-12 PROCEDURE — 3008F BODY MASS INDEX DOCD: CPT | Performed by: PHYSICIAN ASSISTANT

## 2021-08-12 PROCEDURE — 3074F SYST BP LT 130 MM HG: CPT | Performed by: PHYSICIAN ASSISTANT

## 2021-08-12 PROCEDURE — 3078F DIAST BP <80 MM HG: CPT | Performed by: PHYSICIAN ASSISTANT

## 2021-08-12 PROCEDURE — 99395 PREV VISIT EST AGE 18-39: CPT | Performed by: PHYSICIAN ASSISTANT

## 2021-08-12 NOTE — PROGRESS NOTES
HPI/Subjective:   Patient ID: Ema Saleem is a 23year old adult. HPI   Patient presents today requesting physical exam. Overall feeling okay.      Diet: decreased appetite, meals are not well balanced  Exercise: very active with work  Tobacco use: den puffs into the lungs every 4 to 6 hours as needed for Wheezing. 3 each 0   • clonazePAM 0.5 MG Oral Tab Take 1 tablet (0.5 mg total) by mouth nightly as needed for Anxiety.  14 tablet 0   • sertraline 100 MG Oral Tab Take 1 tablet (100 mg total) by mouth da Right Ear: Tympanic membrane, ear canal and external ear normal.      Left Ear: Tympanic membrane, ear canal and external ear normal.      Nose: Nose normal.      Mouth/Throat:      Mouth: Mucous membranes are moist.      Comments:  Thick post nasal drip  E Future    2. Chronic pain of both knees  Check baseline xray. Discussed the importance of regular stretching. May need PT or referral to physiatry. - XR KNEE ROUTINE (3 VIEWS), LEFT (CPT=73562); Future  - XR KNEE ROUTINE (3 VIEWS), RIGHT (CPT=73562);  Fut

## 2021-12-22 ENCOUNTER — NURSE ONLY (OUTPATIENT)
Dept: FAMILY MEDICINE CLINIC | Facility: CLINIC | Age: 21
End: 2021-12-22
Payer: COMMERCIAL

## 2021-12-22 DIAGNOSIS — Z23 NEED FOR VACCINATION: Primary | ICD-10-CM

## 2021-12-22 PROCEDURE — 90686 IIV4 VACC NO PRSV 0.5 ML IM: CPT | Performed by: FAMILY MEDICINE

## 2021-12-22 PROCEDURE — 90471 IMMUNIZATION ADMIN: CPT | Performed by: FAMILY MEDICINE

## 2021-12-30 ENCOUNTER — OFFICE VISIT (OUTPATIENT)
Dept: FAMILY MEDICINE CLINIC | Facility: CLINIC | Age: 21
End: 2021-12-30
Payer: COMMERCIAL

## 2021-12-30 ENCOUNTER — TELEPHONE (OUTPATIENT)
Dept: ORTHOPEDICS CLINIC | Facility: CLINIC | Age: 21
End: 2021-12-30

## 2021-12-30 ENCOUNTER — LAB ENCOUNTER (OUTPATIENT)
Dept: LAB | Age: 21
End: 2021-12-30
Attending: PHYSICIAN ASSISTANT
Payer: COMMERCIAL

## 2021-12-30 VITALS
SYSTOLIC BLOOD PRESSURE: 100 MMHG | HEART RATE: 80 BPM | BODY MASS INDEX: 25.24 KG/M2 | RESPIRATION RATE: 18 BRPM | DIASTOLIC BLOOD PRESSURE: 68 MMHG | WEIGHT: 160.81 LBS | HEIGHT: 67 IN | OXYGEN SATURATION: 97 %

## 2021-12-30 DIAGNOSIS — R25.3 FASCICULATIONS OF MUSCLE: ICD-10-CM

## 2021-12-30 DIAGNOSIS — G89.29 CHRONIC PAIN OF BOTH KNEES: ICD-10-CM

## 2021-12-30 DIAGNOSIS — M25.561 CHRONIC PAIN OF BOTH KNEES: ICD-10-CM

## 2021-12-30 DIAGNOSIS — M25.561 RIGHT KNEE PAIN, UNSPECIFIED CHRONICITY: ICD-10-CM

## 2021-12-30 DIAGNOSIS — L98.9 ECZEMATOUS SKIN LESIONS: ICD-10-CM

## 2021-12-30 DIAGNOSIS — M25.562 CHRONIC PAIN OF BOTH KNEES: ICD-10-CM

## 2021-12-30 DIAGNOSIS — M25.562 LEFT KNEE PAIN, UNSPECIFIED CHRONICITY: Primary | ICD-10-CM

## 2021-12-30 DIAGNOSIS — N94.6 SEVERE MENSTRUAL CRAMPS: Primary | ICD-10-CM

## 2021-12-30 LAB — CK SERPL-CCNC: 53 U/L

## 2021-12-30 PROCEDURE — 3008F BODY MASS INDEX DOCD: CPT | Performed by: PHYSICIAN ASSISTANT

## 2021-12-30 PROCEDURE — 3078F DIAST BP <80 MM HG: CPT | Performed by: PHYSICIAN ASSISTANT

## 2021-12-30 PROCEDURE — 82550 ASSAY OF CK (CPK): CPT | Performed by: PHYSICIAN ASSISTANT

## 2021-12-30 PROCEDURE — 99214 OFFICE O/P EST MOD 30 MIN: CPT | Performed by: PHYSICIAN ASSISTANT

## 2021-12-30 PROCEDURE — 3074F SYST BP LT 130 MM HG: CPT | Performed by: PHYSICIAN ASSISTANT

## 2021-12-30 RX ORDER — KETOCONAZOLE 20 MG/ML
SHAMPOO TOPICAL
COMMUNITY
Start: 2021-11-03

## 2021-12-30 RX ORDER — PREDNISONE 10 MG/1
TABLET ORAL
Qty: 18 TABLET | Refills: 0 | Status: SHIPPED | OUTPATIENT
Start: 2021-12-30 | End: 2022-01-11 | Stop reason: ALTCHOICE

## 2021-12-30 RX ORDER — DICLOFENAC SODIUM 75 MG/1
75 TABLET, DELAYED RELEASE ORAL 2 TIMES DAILY
Qty: 30 TABLET | Refills: 2 | Status: SHIPPED | OUTPATIENT
Start: 2021-12-30

## 2021-12-30 NOTE — TELEPHONE ENCOUNTER
Reviewed patients chart, xray orders are required. Order placed for bilateral knee xrays.  Please contact patient advise to arrive 30 mins prior to patients appt to complete x-ray order   Future Appointments   Date Time Provider Chivo Corbett   1/4/2022

## 2021-12-30 NOTE — PROGRESS NOTES
Subjective:   Patient ID: Mikey Garg is a 24year old adult. HPI   Patient presents to discuss a few concerns.     Still having chronic bilateral knee pain  X-rays of bilateral knees obtained which were largely unremarkable  Taking glucosamine but not leg swelling. Gastrointestinal: Negative for abdominal pain, diarrhea, nausea and vomiting. Genitourinary: Positive for menstrual problem (severe cramping). Negative for dysuria, frequency and hematuria.    Musculoskeletal: Positive for arthralgias (seema B 12) 100 MCG Oral Lozenge Take by mouth. • Melatonin 10 MG Oral Cap Take by mouth. • Cholecalciferol (VITAMIN D3) 250 MCG (49504 UT) Oral Cap Take by mouth. • Fexofenadine HCl 180 MG Oral Tab Take 180 mg by mouth daily.      • Diclofenac Sodium normal.      Breath sounds: Normal breath sounds. No wheezing or rales. Abdominal:      General: Bowel sounds are normal. There is no distension. Palpations: Abdomen is soft. There is no mass. Tenderness: There is no abdominal tenderness.    Eden Balbuena normal eczema flareup. There is some suspicion for psoriasis given the appearance of her lesions and the lack of healing.   We will first treat with a prednisone taper but if that does not help then I recommend treating the psoriasis and we could add a vit

## 2022-01-10 ENCOUNTER — LAB ENCOUNTER (OUTPATIENT)
Dept: LAB | Age: 22
End: 2022-01-10
Attending: PHYSICIAN ASSISTANT
Payer: COMMERCIAL

## 2022-01-10 DIAGNOSIS — Z00.00 ROUTINE GENERAL MEDICAL EXAMINATION AT A HEALTH CARE FACILITY: ICD-10-CM

## 2022-01-10 LAB
ALBUMIN SERPL-MCNC: 3.8 G/DL (ref 3.4–5)
ALBUMIN/GLOB SERPL: 1.4 {RATIO} (ref 1–2)
ALP LIVER SERPL-CCNC: 38 U/L
ALT SERPL-CCNC: 13 U/L
ANION GAP SERPL CALC-SCNC: 5 MMOL/L (ref 0–18)
AST SERPL-CCNC: 9 U/L (ref 15–37)
BASOPHILS # BLD AUTO: 0.03 X10(3) UL (ref 0–0.2)
BASOPHILS NFR BLD AUTO: 0.5 %
BILIRUB SERPL-MCNC: 0.2 MG/DL (ref 0.1–2)
BUN BLD-MCNC: 11 MG/DL (ref 7–18)
CALCIUM BLD-MCNC: 8.8 MG/DL (ref 8.5–10.1)
CHLORIDE SERPL-SCNC: 109 MMOL/L (ref 98–112)
CHOLEST SERPL-MCNC: 213 MG/DL (ref ?–200)
CO2 SERPL-SCNC: 25 MMOL/L (ref 21–32)
CREAT BLD-MCNC: 0.78 MG/DL
EOSINOPHIL # BLD AUTO: 0.19 X10(3) UL (ref 0–0.7)
EOSINOPHIL NFR BLD AUTO: 2.9 %
ERYTHROCYTE [DISTWIDTH] IN BLOOD BY AUTOMATED COUNT: 13.3 %
FASTING PATIENT LIPID ANSWER: YES
FASTING STATUS PATIENT QL REPORTED: YES
GLOBULIN PLAS-MCNC: 2.8 G/DL (ref 2.8–4.4)
GLUCOSE BLD-MCNC: 88 MG/DL (ref 70–99)
HCT VFR BLD AUTO: 38.9 %
HDLC SERPL-MCNC: 67 MG/DL (ref 40–59)
HGB BLD-MCNC: 12.5 G/DL
IMM GRANULOCYTES # BLD AUTO: 0.01 X10(3) UL (ref 0–1)
IMM GRANULOCYTES NFR BLD: 0.2 %
LDLC SERPL CALC-MCNC: 129 MG/DL (ref ?–100)
LYMPHOCYTES # BLD AUTO: 1.98 X10(3) UL (ref 1–4)
LYMPHOCYTES NFR BLD AUTO: 30.5 %
MCH RBC QN AUTO: 28.4 PG (ref 26–34)
MCHC RBC AUTO-ENTMCNC: 32.1 G/DL (ref 31–37)
MCV RBC AUTO: 88.4 FL
MONOCYTES # BLD AUTO: 0.48 X10(3) UL (ref 0.1–1)
MONOCYTES NFR BLD AUTO: 7.4 %
NEUTROPHILS # BLD AUTO: 3.81 X10 (3) UL (ref 1.5–7.7)
NEUTROPHILS # BLD AUTO: 3.81 X10(3) UL (ref 1.5–7.7)
NEUTROPHILS NFR BLD AUTO: 58.5 %
NONHDLC SERPL-MCNC: 146 MG/DL (ref ?–130)
OSMOLALITY SERPL CALC.SUM OF ELEC: 287 MOSM/KG (ref 275–295)
PLATELET # BLD AUTO: 227 10(3)UL (ref 150–450)
POTASSIUM SERPL-SCNC: 4.2 MMOL/L (ref 3.5–5.1)
PROT SERPL-MCNC: 6.6 G/DL (ref 6.4–8.2)
RBC # BLD AUTO: 4.4 X10(6)UL
SODIUM SERPL-SCNC: 139 MMOL/L (ref 136–145)
TRIGL SERPL-MCNC: 96 MG/DL (ref 30–149)
TSI SER-ACNC: 3.65 MIU/ML (ref 0.36–3.74)
VIT B12 SERPL-MCNC: 437 PG/ML (ref 193–986)
VIT D+METAB SERPL-MCNC: 15.9 NG/ML (ref 30–100)
VLDLC SERPL CALC-MCNC: 17 MG/DL (ref 0–30)
WBC # BLD AUTO: 6.5 X10(3) UL (ref 4–11)

## 2022-01-10 PROCEDURE — 80050 GENERAL HEALTH PANEL: CPT | Performed by: PHYSICIAN ASSISTANT

## 2022-01-10 PROCEDURE — 82306 VITAMIN D 25 HYDROXY: CPT | Performed by: PHYSICIAN ASSISTANT

## 2022-01-10 PROCEDURE — 80061 LIPID PANEL: CPT | Performed by: PHYSICIAN ASSISTANT

## 2022-01-10 PROCEDURE — 82607 VITAMIN B-12: CPT | Performed by: PHYSICIAN ASSISTANT

## 2022-01-12 ENCOUNTER — OFFICE VISIT (OUTPATIENT)
Dept: ORTHOPEDICS CLINIC | Facility: CLINIC | Age: 22
End: 2022-01-12
Payer: COMMERCIAL

## 2022-01-12 ENCOUNTER — HOSPITAL ENCOUNTER (OUTPATIENT)
Dept: GENERAL RADIOLOGY | Age: 22
Discharge: HOME | End: 2022-01-12
Attending: PHYSICIAN ASSISTANT
Payer: COMMERCIAL

## 2022-01-12 ENCOUNTER — TELEPHONE (OUTPATIENT)
Dept: ORTHOPEDICS CLINIC | Facility: CLINIC | Age: 22
End: 2022-01-12

## 2022-01-12 VITALS — WEIGHT: 165 LBS | BODY MASS INDEX: 25.9 KG/M2 | OXYGEN SATURATION: 98 % | HEART RATE: 101 BPM | HEIGHT: 67 IN

## 2022-01-12 DIAGNOSIS — M25.561 RIGHT KNEE PAIN, UNSPECIFIED CHRONICITY: ICD-10-CM

## 2022-01-12 DIAGNOSIS — S83.207A POSITIVE MCMURRAY TEST OF LEFT KNEE, INITIAL ENCOUNTER: ICD-10-CM

## 2022-01-12 DIAGNOSIS — S83.206A POSITIVE MCMURRAY TEST OF RIGHT KNEE, INITIAL ENCOUNTER: ICD-10-CM

## 2022-01-12 DIAGNOSIS — M25.562 LEFT KNEE PAIN, UNSPECIFIED CHRONICITY: ICD-10-CM

## 2022-01-12 DIAGNOSIS — M25.362 KNEE INSTABILITY, LEFT: Primary | ICD-10-CM

## 2022-01-12 PROCEDURE — 99215 OFFICE O/P EST HI 40 MIN: CPT | Performed by: PHYSICIAN ASSISTANT

## 2022-01-12 PROCEDURE — 73564 X-RAY EXAM KNEE 4 OR MORE: CPT | Performed by: PHYSICIAN ASSISTANT

## 2022-01-12 PROCEDURE — 3008F BODY MASS INDEX DOCD: CPT | Performed by: PHYSICIAN ASSISTANT

## 2022-01-12 NOTE — TELEPHONE ENCOUNTER
When submitting via [facility], and you are submitting bilateral you do not put in either knee so it can be run bilaterally. Pt mother called prior to change site which was done, mother called a second time to make sure it was done through insight and Mother Helena Melchor

## 2022-01-12 NOTE — TELEPHONE ENCOUNTER
Patient's mother called and stated that [facility] told her that they only have authorized 1 knee for an MRI. She said that she called already once today and hadn't gotten this resolved. Patient needs an MRI on NAHUN Knees.   The patient's appt to get the MRIs com

## 2022-01-12 NOTE — H&P
[facility] - ORTHOPEDICS   [address], [address]   [phone]     NEW PATIENT VISIT - HISTORY AND PHYSICAL EXAMINATION     Name: Clifford Coleman   MRN: [id number]  Date: 1/12/2022     CC: Bilateral knee pain, left total) by mouth 2 (two) times a day. 60 tablet 2   • ketoconazole 2 % External Shampoo      • Amphetamine-Dextroamphet ER (ADDERALL XR) 5 MG Oral Capsule SR 24 Hr Take 1 capsule (5 mg total) by mouth daily.  30 capsule 0   • amphetamine-dextroamphetamine (A capsule 0   • diclofenac 75 MG Oral Tab EC Take 1 tablet (75 mg total) by mouth 2 (two) times daily.  (Patient not taking: Reported on 1/12/2022) 30 tablet 2   • Albuterol Sulfate HFA (PROAIR HFA) 108 (90 Base) MCG/ACT Inhalation Aero Soln Inhale 2 puffs in Capillary Refill: Capillary refill takes less than 2 seconds. Findings: No bruising. Neurological:      General: No focal deficit present. Mental Status: Alert.    Psychiatric:         Mood and Affect: Mood normal.     Examination of the left kn spaces are well preserved. IMPRESSION: Lorin Carpio is a 24year old adult who presents with bilateral knee pain, concerning for right knee meniscal pathology and left knee instability/meniscal pathology.      PLAN:   We had a detailed discussion outli

## 2022-01-12 NOTE — TELEPHONE ENCOUNTER
Karena from [employer] [facility] calling stating patients MRI for right knee was not approved.  Please advise

## 2022-03-10 ENCOUNTER — TELEPHONE (OUTPATIENT)
Dept: FAMILY MEDICINE CLINIC | Facility: CLINIC | Age: 22
End: 2022-03-10

## 2022-03-10 NOTE — TELEPHONE ENCOUNTER
Hard to say what the etiology is without being able to evaluate her in person, but I agree with advice given. If this recurs/worsens, she needs to be checked out.

## 2022-03-18 ENCOUNTER — OFFICE VISIT (OUTPATIENT)
Dept: FAMILY MEDICINE CLINIC | Facility: CLINIC | Age: 22
End: 2022-03-18
Payer: COMMERCIAL

## 2022-03-18 VITALS
HEIGHT: 67 IN | WEIGHT: 167 LBS | RESPIRATION RATE: 16 BRPM | OXYGEN SATURATION: 99 % | BODY MASS INDEX: 26.21 KG/M2 | HEART RATE: 73 BPM

## 2022-03-18 DIAGNOSIS — R00.2 PALPITATIONS: ICD-10-CM

## 2022-03-18 DIAGNOSIS — G47.9 SLEEPING DIFFICULTY: Primary | ICD-10-CM

## 2022-03-18 DIAGNOSIS — R07.89 OTHER CHEST PAIN: ICD-10-CM

## 2022-03-18 DIAGNOSIS — N94.6 SEVERE MENSTRUAL CRAMPS: ICD-10-CM

## 2022-03-18 PROCEDURE — 3008F BODY MASS INDEX DOCD: CPT | Performed by: PHYSICIAN ASSISTANT

## 2022-03-18 PROCEDURE — 99214 OFFICE O/P EST MOD 30 MIN: CPT | Performed by: PHYSICIAN ASSISTANT

## 2022-03-18 RX ORDER — KETOROLAC TROMETHAMINE 10 MG/1
10 TABLET, FILM COATED ORAL EVERY 6 HOURS PRN
Qty: 15 TABLET | Refills: 2 | Status: SHIPPED | OUTPATIENT
Start: 2022-03-18

## 2022-03-28 ENCOUNTER — TELEPHONE (OUTPATIENT)
Dept: FAMILY MEDICINE CLINIC | Facility: CLINIC | Age: 22
End: 2022-03-28

## 2022-03-28 NOTE — TELEPHONE ENCOUNTER
Spoke to CELSA Stern  States pt called her office stating she need to speak with Ana María on 'medical issues\" he was not very clear. Is this something you need to speak to Milton about?   Notified her LR not in till Thursday, message with be routed

## 2022-03-28 NOTE — TELEPHONE ENCOUNTER
Pt has contacted Radha Swain about psych medications, she would jamey to speak personally with Vanda Claudio.   Please call

## 2022-03-29 ENCOUNTER — PATIENT MESSAGE (OUTPATIENT)
Dept: ORTHOPEDICS CLINIC | Facility: CLINIC | Age: 22
End: 2022-03-29

## 2022-03-30 NOTE — TELEPHONE ENCOUNTER
From: Mimi Torres  To: Gabrielleph Filter, [place]  Sent: 3/29/2022 5:19 PM CDT  Subject: Patricia's MRIs/Knee condition    This is Patricia's mom. I was just billed for the MRIs you requested for both of Patricia's knees in January. I noticed that only the results for the left knee are in her chart - did you ever see the results for her right knee (I am being charged for both). Also, in the notes for the Jan.12 appt with you, both assessments are labeled for the left knee - I would just like to know which is which. Finally, Doris Mayo, Patricia's PCP, is exploring the possibility of Fibromyalgia for Patricia. I know you discussed a particular congenital condition with Patricia that explained their weak knees, but can't find that in the records, and am wondering if that condition is a definite diagnosis, or could also be explained by fibromyalgia. I apologize for the 10-week delay, but there's a lot to track for my child, medically speaking. Thank you.

## 2022-04-14 ENCOUNTER — OFFICE VISIT (OUTPATIENT)
Dept: SLEEP CENTER | Age: 22
End: 2022-04-14
Attending: INTERNAL MEDICINE
Payer: COMMERCIAL

## 2022-04-14 DIAGNOSIS — G47.9 SLEEPING DIFFICULTY: ICD-10-CM

## 2022-04-14 PROCEDURE — 95806 SLEEP STUDY UNATT&RESP EFFT: CPT

## 2022-05-27 ENCOUNTER — OFFICE VISIT (OUTPATIENT)
Dept: FAMILY MEDICINE CLINIC | Facility: CLINIC | Age: 22
End: 2022-05-27
Payer: COMMERCIAL

## 2022-05-27 ENCOUNTER — PATIENT MESSAGE (OUTPATIENT)
Dept: FAMILY MEDICINE CLINIC | Facility: CLINIC | Age: 22
End: 2022-05-27

## 2022-05-27 ENCOUNTER — LAB ENCOUNTER (OUTPATIENT)
Dept: LAB | Age: 22
End: 2022-05-27
Attending: PHYSICIAN ASSISTANT
Payer: COMMERCIAL

## 2022-05-27 VITALS
OXYGEN SATURATION: 99 % | RESPIRATION RATE: 16 BRPM | HEART RATE: 81 BPM | SYSTOLIC BLOOD PRESSURE: 106 MMHG | BODY MASS INDEX: 26.37 KG/M2 | DIASTOLIC BLOOD PRESSURE: 74 MMHG | HEIGHT: 67 IN | WEIGHT: 168 LBS

## 2022-05-27 DIAGNOSIS — L40.9 PSORIASIS: ICD-10-CM

## 2022-05-27 DIAGNOSIS — M25.50 MULTIPLE JOINT PAIN: Primary | ICD-10-CM

## 2022-05-27 LAB
CRP SERPL-MCNC: <0.29 MG/DL (ref ?–0.3)
ERYTHROCYTE [SEDIMENTATION RATE] IN BLOOD: 11 MM/HR
RHEUMATOID FACT SERPL-ACNC: <10 IU/ML (ref ?–15)

## 2022-05-27 PROCEDURE — 86038 ANTINUCLEAR ANTIBODIES: CPT | Performed by: PHYSICIAN ASSISTANT

## 2022-05-27 PROCEDURE — 36415 COLL VENOUS BLD VENIPUNCTURE: CPT | Performed by: PHYSICIAN ASSISTANT

## 2022-05-27 PROCEDURE — 86140 C-REACTIVE PROTEIN: CPT | Performed by: PHYSICIAN ASSISTANT

## 2022-05-27 PROCEDURE — 86431 RHEUMATOID FACTOR QUANT: CPT | Performed by: PHYSICIAN ASSISTANT

## 2022-05-27 PROCEDURE — 85652 RBC SED RATE AUTOMATED: CPT | Performed by: PHYSICIAN ASSISTANT

## 2022-05-27 RX ORDER — CLOBETASOL PROPIONATE 0.5 MG/G
1 OINTMENT TOPICAL 2 TIMES DAILY
Qty: 60 G | Refills: 2 | Status: SHIPPED | OUTPATIENT
Start: 2022-05-27

## 2022-05-27 RX ORDER — METHYLPREDNISOLONE SODIUM SUCCINATE 125 MG/2ML
125 INJECTION, POWDER, LYOPHILIZED, FOR SOLUTION INTRAMUSCULAR; INTRAVENOUS ONCE
Status: COMPLETED | OUTPATIENT
Start: 2022-05-27 | End: 2022-05-27

## 2022-05-27 RX ORDER — CALCIPOTRIENE 50 UG/G
1 OINTMENT TOPICAL 2 TIMES DAILY
Qty: 60 G | Refills: 2 | Status: SHIPPED | OUTPATIENT
Start: 2022-05-27

## 2022-05-27 RX ADMIN — METHYLPREDNISOLONE SODIUM SUCCINATE 125 MG: 125 INJECTION, POWDER, LYOPHILIZED, FOR SOLUTION INTRAMUSCULAR; INTRAVENOUS at 15:08:00

## 2022-05-31 NOTE — TELEPHONE ENCOUNTER
From: Eladio Sanford  To: Tom Blackwell PA-C  Sent: 5/27/2022 3:24 PM CDT  Subject: Only one Rx available for now    This message is being sent by Marcelina Ruano on behalf of Eladio Sanford. Other is not available until May 31. Should I start with the one for now?   Thanks

## 2022-06-03 LAB — ANA SER QL: NEGATIVE

## 2022-07-28 ENCOUNTER — TELEPHONE (OUTPATIENT)
Dept: FAMILY MEDICINE CLINIC | Facility: CLINIC | Age: 22
End: 2022-07-28

## 2022-07-28 ENCOUNTER — MED REC SCAN ONLY (OUTPATIENT)
Dept: FAMILY MEDICINE CLINIC | Facility: CLINIC | Age: 22
End: 2022-07-28

## 2022-08-08 ENCOUNTER — OFFICE VISIT (OUTPATIENT)
Dept: FAMILY MEDICINE CLINIC | Facility: CLINIC | Age: 22
End: 2022-08-08
Payer: COMMERCIAL

## 2022-08-08 VITALS
WEIGHT: 169.81 LBS | SYSTOLIC BLOOD PRESSURE: 100 MMHG | HEART RATE: 80 BPM | OXYGEN SATURATION: 98 % | RESPIRATION RATE: 16 BRPM | DIASTOLIC BLOOD PRESSURE: 60 MMHG | HEIGHT: 67 IN | BODY MASS INDEX: 26.65 KG/M2

## 2022-08-08 DIAGNOSIS — F90.2 ATTENTION DEFICIT HYPERACTIVITY DISORDER (ADHD), COMBINED TYPE: ICD-10-CM

## 2022-08-08 DIAGNOSIS — M79.7 FIBROMYALGIA: ICD-10-CM

## 2022-08-08 DIAGNOSIS — J45.40 MODERATE PERSISTENT ASTHMA WITHOUT COMPLICATION: ICD-10-CM

## 2022-08-08 DIAGNOSIS — Z00.00 ROUTINE GENERAL MEDICAL EXAMINATION AT A HEALTH CARE FACILITY: Primary | ICD-10-CM

## 2022-08-08 DIAGNOSIS — F41.9 ANXIETY: ICD-10-CM

## 2022-08-08 PROCEDURE — 3008F BODY MASS INDEX DOCD: CPT | Performed by: PHYSICIAN ASSISTANT

## 2022-08-08 PROCEDURE — 3078F DIAST BP <80 MM HG: CPT | Performed by: PHYSICIAN ASSISTANT

## 2022-08-08 PROCEDURE — 3074F SYST BP LT 130 MM HG: CPT | Performed by: PHYSICIAN ASSISTANT

## 2022-08-08 PROCEDURE — 99395 PREV VISIT EST AGE 18-39: CPT | Performed by: PHYSICIAN ASSISTANT

## 2022-08-08 RX ORDER — GABAPENTIN 100 MG/1
100 CAPSULE ORAL NIGHTLY
COMMUNITY
Start: 2022-08-01 | End: 2022-09-01

## 2022-08-08 RX ORDER — EPINEPHRINE 0.3 MG/.3ML
1 INJECTION SUBCUTANEOUS AS DIRECTED
COMMUNITY
Start: 2022-07-13

## 2022-08-08 NOTE — PATIENT INSTRUCTIONS
Connective Tissue Disorders (Zofia, EDS) -   [facility]-  If you have not yet received genetic testing, please contact the Division of Genetics, Birth Defects and Metabolism at [phone]. If you have already received genetic testing, please contact our clinical  Rebeca Villagomez at [phone] or [email or web address]. Additionally, please send all cardiology records and genetics results to the Division of Cardiology via fax: [phone]. [address]   [phone]   Loretta Carbajal MD (rheumatology) - [address] [place] [address]  [facility]  [address] [address]  [place] [address]   Phone: [phone]  Fax: [phone] [address] [facility]  [address]  [address]  Office: [phone]

## 2022-10-25 ENCOUNTER — HOSPITAL ENCOUNTER (OUTPATIENT)
Age: 22
Discharge: HOME | End: 2022-10-25
Payer: COMMERCIAL

## 2022-10-25 VITALS
OXYGEN SATURATION: 97 % | WEIGHT: 168 LBS | BODY MASS INDEX: 26.37 KG/M2 | SYSTOLIC BLOOD PRESSURE: 107 MMHG | DIASTOLIC BLOOD PRESSURE: 57 MMHG | HEART RATE: 80 BPM | RESPIRATION RATE: 18 BRPM | HEIGHT: 67 IN | TEMPERATURE: 98 F

## 2022-10-25 DIAGNOSIS — U07.1 COVID-19: Primary | ICD-10-CM

## 2022-10-25 LAB
POCT INFLUENZA A: NEGATIVE
POCT INFLUENZA B: NEGATIVE
S PYO AG THROAT QL: NEGATIVE
SARS-COV-2 RNA RESP QL NAA+PROBE: DETECTED

## 2022-10-25 PROCEDURE — 99213 OFFICE O/P EST LOW 20 MIN: CPT

## 2022-10-25 PROCEDURE — 87880 STREP A ASSAY W/OPTIC: CPT

## 2022-10-25 PROCEDURE — 87502 INFLUENZA DNA AMP PROBE: CPT | Performed by: PHYSICIAN ASSISTANT

## 2022-10-25 RX ORDER — GABAPENTIN 300 MG/1
300 CAPSULE ORAL EVERY EVENING
COMMUNITY
Start: 2022-09-24 | End: 2022-10-28

## 2022-10-25 RX ORDER — CLINDAMYCIN PHOSPHATE 10 UG/ML
LOTION TOPICAL
COMMUNITY
Start: 2022-10-14

## 2022-10-25 RX ORDER — ALBUTEROL SULFATE 90 UG/1
AEROSOL, METERED RESPIRATORY (INHALATION) EVERY 6 HOURS PRN
COMMUNITY

## 2022-10-25 NOTE — ED INITIAL ASSESSMENT (HPI)
Pt has been ill with fever cough sore throat, and fever for 2 weeks.   Pt was getting covid tested , and strep tested and thy were neg, she got a little better but 3 days ago cough came back and it got really bad

## 2022-10-28 RX ORDER — LAMOTRIGINE 100 MG/1
100 TABLET, EXTENDED RELEASE ORAL 2 TIMES DAILY
Qty: 20 TABLET | Refills: 0 | Status: SHIPPED | OUTPATIENT
Start: 2022-10-28 | End: 2023-04-26

## 2022-10-28 RX ORDER — QUETIAPINE FUMARATE 50 MG/1
50 TABLET, FILM COATED ORAL NIGHTLY
Qty: 10 TABLET | Refills: 0 | Status: SHIPPED | OUTPATIENT
Start: 2022-10-28

## 2022-10-28 RX ORDER — GABAPENTIN 300 MG/1
300 CAPSULE ORAL EVERY EVENING
Qty: 10 CAPSULE | Refills: 0 | Status: SHIPPED | OUTPATIENT
Start: 2022-10-28

## 2022-10-28 RX ORDER — SERTRALINE HYDROCHLORIDE 100 MG/1
100 TABLET, FILM COATED ORAL DAILY
Qty: 10 TABLET | Refills: 0 | Status: SHIPPED | OUTPATIENT
Start: 2022-10-28

## 2022-10-28 NOTE — TELEPHONE ENCOUNTER
Gabapentin, lamotrigine, quetiapine and sertraline - pt request 10 day supply to [facility] on [address]. She left her meds at school and is currently home with COVID at this time.

## 2022-11-03 ENCOUNTER — HOSPITAL ENCOUNTER (OUTPATIENT)
Age: 22
Discharge: HOME | End: 2022-11-03
Payer: COMMERCIAL

## 2022-11-03 ENCOUNTER — APPOINTMENT (OUTPATIENT)
Dept: GENERAL RADIOLOGY | Age: 22
End: 2022-11-03
Attending: PHYSICIAN ASSISTANT
Payer: COMMERCIAL

## 2022-11-03 ENCOUNTER — TELEPHONE (OUTPATIENT)
Dept: FAMILY MEDICINE CLINIC | Facility: CLINIC | Age: 22
End: 2022-11-03

## 2022-11-03 VITALS
WEIGHT: 167 LBS | HEART RATE: 74 BPM | DIASTOLIC BLOOD PRESSURE: 60 MMHG | OXYGEN SATURATION: 98 % | TEMPERATURE: 98 F | HEIGHT: 67 IN | RESPIRATION RATE: 18 BRPM | SYSTOLIC BLOOD PRESSURE: 110 MMHG | BODY MASS INDEX: 26.21 KG/M2

## 2022-11-03 DIAGNOSIS — J45.21 MILD INTERMITTENT ASTHMA WITH EXACERBATION: ICD-10-CM

## 2022-11-03 DIAGNOSIS — U07.1 COVID-19: Primary | ICD-10-CM

## 2022-11-03 PROCEDURE — 99214 OFFICE O/P EST MOD 30 MIN: CPT

## 2022-11-03 PROCEDURE — 94640 AIRWAY INHALATION TREATMENT: CPT

## 2022-11-03 PROCEDURE — 71046 X-RAY EXAM CHEST 2 VIEWS: CPT | Performed by: PHYSICIAN ASSISTANT

## 2022-11-03 RX ORDER — ALBUTEROL SULFATE 2.5 MG/3ML
2.5 SOLUTION RESPIRATORY (INHALATION) EVERY 4 HOURS PRN
Qty: 30 EACH | Refills: 0 | Status: SHIPPED | OUTPATIENT
Start: 2022-11-03 | End: 2022-12-03

## 2022-11-03 RX ORDER — ALBUTEROL SULFATE 90 UG/1
2 AEROSOL, METERED RESPIRATORY (INHALATION) EVERY 4 HOURS PRN
Qty: 1 EACH | Refills: 0 | Status: SHIPPED | OUTPATIENT
Start: 2022-11-03 | End: 2022-12-03

## 2022-11-03 RX ORDER — ALBUTEROL SULFATE 90 UG/1
8 AEROSOL, METERED RESPIRATORY (INHALATION) ONCE
Status: COMPLETED | OUTPATIENT
Start: 2022-11-03 | End: 2022-11-03

## 2022-11-03 RX ORDER — PREDNISONE 20 MG/1
40 TABLET ORAL DAILY
Qty: 10 TABLET | Refills: 0 | Status: SHIPPED | OUTPATIENT
Start: 2022-11-03 | End: 2022-11-08

## 2022-11-03 RX ORDER — PREDNISONE 20 MG/1
60 TABLET ORAL ONCE
Status: COMPLETED | OUTPATIENT
Start: 2022-11-03 | End: 2022-11-03

## 2022-11-03 NOTE — TELEPHONE ENCOUNTER
Pt said her COVID sx seem to be worsening, NO SOB or difficulty breathing. (no openings this week)  Calling for nurse advice.   pls call

## 2022-11-03 NOTE — TELEPHONE ENCOUNTER
covid + 10/25,   Still with cough, loss taste last Monday, sore throat since yesterday, deep breaths painful/triggers cough  Advised IC, mother agrees and will proceed

## 2022-11-04 NOTE — DISCHARGE INSTRUCTIONS
Start oral steroids tomorrow use albuterol inhaler or nebulizer every 4-6 hours    Please return to the Emergency department/clinic if symptoms worsen. Follow up with your primary care physician in 1-2 days as needed. Take any medications prescribed to you as instructed and complete any antibiotic prescription you begin.

## 2022-11-08 DIAGNOSIS — N94.6 SEVERE MENSTRUAL CRAMPS: ICD-10-CM

## 2022-11-09 RX ORDER — KETOROLAC TROMETHAMINE 10 MG/1
TABLET, FILM COATED ORAL
Qty: 15 TABLET | Refills: 2 | Status: SHIPPED | OUTPATIENT
Start: 2022-11-09

## 2022-12-19 RX ORDER — QUETIAPINE FUMARATE 50 MG/1
TABLET, FILM COATED ORAL
Qty: 10 TABLET | Refills: 0 | Status: SHIPPED | OUTPATIENT
Start: 2022-12-19

## 2023-01-04 RX ORDER — QUETIAPINE FUMARATE 50 MG/1
50 TABLET, FILM COATED ORAL NIGHTLY
Qty: 10 TABLET | Refills: 0 | Status: SHIPPED | OUTPATIENT
Start: 2023-01-04

## 2023-01-26 ENCOUNTER — OFFICE VISIT (OUTPATIENT)
Dept: FAMILY MEDICINE CLINIC | Facility: CLINIC | Age: 23
End: 2023-01-26
Payer: COMMERCIAL

## 2023-01-26 VITALS
RESPIRATION RATE: 18 BRPM | DIASTOLIC BLOOD PRESSURE: 70 MMHG | BODY MASS INDEX: 26 KG/M2 | SYSTOLIC BLOOD PRESSURE: 116 MMHG | HEIGHT: 67 IN | OXYGEN SATURATION: 99 % | HEART RATE: 78 BPM

## 2023-01-26 DIAGNOSIS — L30.9 DERMATITIS: ICD-10-CM

## 2023-01-26 DIAGNOSIS — N94.6 DYSMENORRHEA: Primary | ICD-10-CM

## 2023-01-26 PROCEDURE — 3074F SYST BP LT 130 MM HG: CPT | Performed by: PHYSICIAN ASSISTANT

## 2023-01-26 PROCEDURE — 99214 OFFICE O/P EST MOD 30 MIN: CPT | Performed by: PHYSICIAN ASSISTANT

## 2023-01-26 PROCEDURE — 3078F DIAST BP <80 MM HG: CPT | Performed by: PHYSICIAN ASSISTANT

## 2023-01-26 RX ORDER — TRAMADOL HYDROCHLORIDE 50 MG/1
50 TABLET ORAL EVERY 6 HOURS PRN
Qty: 30 TABLET | Refills: 0 | Status: SHIPPED | OUTPATIENT
Start: 2023-01-26

## 2023-01-26 RX ORDER — METRONIDAZOLE 10 MG/G
1 GEL TOPICAL DAILY
Qty: 60 G | Refills: 1 | Status: SHIPPED | OUTPATIENT
Start: 2023-01-26 | End: 2023-05-26

## 2023-01-27 ENCOUNTER — TELEPHONE (OUTPATIENT)
Dept: FAMILY MEDICINE CLINIC | Facility: CLINIC | Age: 23
End: 2023-01-27

## 2023-02-09 ENCOUNTER — HOSPITAL ENCOUNTER (OUTPATIENT)
Dept: ULTRASOUND IMAGING | Age: 23
Discharge: HOME | End: 2023-02-09
Attending: PHYSICIAN ASSISTANT
Payer: COMMERCIAL

## 2023-02-09 DIAGNOSIS — N94.6 DYSMENORRHEA: ICD-10-CM

## 2023-02-09 PROCEDURE — 76856 US EXAM PELVIC COMPLETE: CPT | Performed by: PHYSICIAN ASSISTANT

## 2023-03-16 ENCOUNTER — ORDER TRANSCRIPTION (OUTPATIENT)
Dept: PHYSICAL THERAPY | Facility: HOSPITAL | Age: 23
End: 2023-03-16

## 2023-03-16 DIAGNOSIS — M79.10 MYALGIA: Primary | ICD-10-CM

## 2023-03-28 ENCOUNTER — TELEPHONE (OUTPATIENT)
Dept: PHYSICAL THERAPY | Facility: HOSPITAL | Age: 23
End: 2023-03-28

## 2023-04-03 ENCOUNTER — APPOINTMENT (OUTPATIENT)
Dept: PHYSICAL THERAPY | Age: 23
End: 2023-04-03
Attending: PHYSICAL MEDICINE & REHABILITATION
Payer: COMMERCIAL

## 2023-04-03 ENCOUNTER — TELEPHONE (OUTPATIENT)
Dept: PHYSICAL THERAPY | Facility: HOSPITAL | Age: 23
End: 2023-04-03

## 2023-04-06 ENCOUNTER — TELEPHONE (OUTPATIENT)
Dept: PHYSICAL THERAPY | Facility: HOSPITAL | Age: 23
End: 2023-04-06

## 2023-04-06 ENCOUNTER — APPOINTMENT (OUTPATIENT)
Dept: PHYSICAL THERAPY | Age: 23
End: 2023-04-06
Attending: PHYSICAL MEDICINE & REHABILITATION
Payer: COMMERCIAL

## 2023-04-10 ENCOUNTER — APPOINTMENT (OUTPATIENT)
Dept: PHYSICAL THERAPY | Age: 23
End: 2023-04-10
Attending: PHYSICAL MEDICINE & REHABILITATION
Payer: COMMERCIAL

## 2023-04-10 ENCOUNTER — TELEPHONE (OUTPATIENT)
Dept: PHYSICAL THERAPY | Facility: HOSPITAL | Age: 23
End: 2023-04-10

## 2023-04-12 ENCOUNTER — APPOINTMENT (OUTPATIENT)
Dept: PHYSICAL THERAPY | Age: 23
End: 2023-04-12
Attending: PHYSICAL MEDICINE & REHABILITATION
Payer: COMMERCIAL

## 2023-04-13 ENCOUNTER — LAB ENCOUNTER (OUTPATIENT)
Dept: LAB | Age: 23
End: 2023-04-13
Attending: PHYSICIAN ASSISTANT
Payer: COMMERCIAL

## 2023-04-13 ENCOUNTER — HOSPITAL ENCOUNTER (OUTPATIENT)
Dept: CT IMAGING | Facility: HOSPITAL | Age: 23
Discharge: HOME | End: 2023-04-13
Attending: PHYSICIAN ASSISTANT
Payer: COMMERCIAL

## 2023-04-13 ENCOUNTER — OFFICE VISIT (OUTPATIENT)
Dept: FAMILY MEDICINE CLINIC | Facility: CLINIC | Age: 23
End: 2023-04-13
Payer: COMMERCIAL

## 2023-04-13 VITALS
HEIGHT: 67 IN | SYSTOLIC BLOOD PRESSURE: 118 MMHG | HEART RATE: 78 BPM | RESPIRATION RATE: 18 BRPM | DIASTOLIC BLOOD PRESSURE: 62 MMHG | OXYGEN SATURATION: 99 % | WEIGHT: 175 LBS | BODY MASS INDEX: 27.47 KG/M2

## 2023-04-13 DIAGNOSIS — R10.823 RIGHT LOWER QUADRANT ABDOMINAL TENDERNESS WITH REBOUND TENDERNESS: ICD-10-CM

## 2023-04-13 DIAGNOSIS — R42 VERTIGO: ICD-10-CM

## 2023-04-13 DIAGNOSIS — R11.2 NAUSEA VOMITING AND DIARRHEA: ICD-10-CM

## 2023-04-13 DIAGNOSIS — R19.7 NAUSEA VOMITING AND DIARRHEA: ICD-10-CM

## 2023-04-13 DIAGNOSIS — T78.40XD ALLERGIC REACTION, SUBSEQUENT ENCOUNTER: ICD-10-CM

## 2023-04-13 DIAGNOSIS — R10.823 RIGHT LOWER QUADRANT ABDOMINAL TENDERNESS WITH REBOUND TENDERNESS: Primary | ICD-10-CM

## 2023-04-13 DIAGNOSIS — N91.2 AMENORRHEA: ICD-10-CM

## 2023-04-13 LAB
ALBUMIN SERPL-MCNC: 4.5 G/DL (ref 3.4–5)
ALBUMIN/GLOB SERPL: 1.3 {RATIO} (ref 1–2)
ALP LIVER SERPL-CCNC: 46 U/L
ALT SERPL-CCNC: 21 U/L
ANION GAP SERPL CALC-SCNC: 4 MMOL/L (ref 0–18)
AST SERPL-CCNC: 10 U/L (ref 15–37)
BASOPHILS # BLD AUTO: 0.02 X10(3) UL (ref 0–0.2)
BASOPHILS NFR BLD AUTO: 0.3 %
BILIRUB SERPL-MCNC: 0.4 MG/DL (ref 0.1–2)
BUN BLD-MCNC: 10 MG/DL (ref 7–18)
CALCIUM BLD-MCNC: 9.5 MG/DL (ref 8.5–10.1)
CHLORIDE SERPL-SCNC: 108 MMOL/L (ref 98–112)
CO2 SERPL-SCNC: 27 MMOL/L (ref 21–32)
CREAT BLD-MCNC: 1.04 MG/DL
CREAT BLD-MCNC: 1.1 MG/DL
EOSINOPHIL # BLD AUTO: 0.19 X10(3) UL (ref 0–0.7)
EOSINOPHIL NFR BLD AUTO: 3.2 %
ERYTHROCYTE [DISTWIDTH] IN BLOOD BY AUTOMATED COUNT: 13.7 %
FASTING STATUS PATIENT QL REPORTED: NO
GFR SERPLBLD BASED ON 1.73 SQ M-ARVRAT: 72 ML/MIN/1.73M2 (ref 60–?)
GFR SERPLBLD BASED ON 1.73 SQ M-ARVRAT: 77 ML/MIN/1.73M2 (ref 60–?)
GLOBULIN PLAS-MCNC: 3.5 G/DL (ref 2.8–4.4)
GLUCOSE BLD-MCNC: 89 MG/DL (ref 70–99)
HCT VFR BLD AUTO: 42.3 %
HGB BLD-MCNC: 13.7 G/DL
IMM GRANULOCYTES # BLD AUTO: 0.01 X10(3) UL (ref 0–1)
IMM GRANULOCYTES NFR BLD: 0.2 %
LIPASE SERPL-CCNC: 42 U/L (ref 13–75)
LYMPHOCYTES # BLD AUTO: 1.72 X10(3) UL (ref 1–4)
LYMPHOCYTES NFR BLD AUTO: 29.1 %
MCH RBC QN AUTO: 28.3 PG (ref 26–34)
MCHC RBC AUTO-ENTMCNC: 32.4 G/DL (ref 31–37)
MCV RBC AUTO: 87.4 FL
MONOCYTES # BLD AUTO: 0.44 X10(3) UL (ref 0.1–1)
MONOCYTES NFR BLD AUTO: 7.4 %
NEUTROPHILS # BLD AUTO: 3.54 X10 (3) UL (ref 1.5–7.7)
NEUTROPHILS # BLD AUTO: 3.54 X10(3) UL (ref 1.5–7.7)
NEUTROPHILS NFR BLD AUTO: 59.8 %
OSMOLALITY SERPL CALC.SUM OF ELEC: 287 MOSM/KG (ref 275–295)
PLATELET # BLD AUTO: 252 10(3)UL (ref 150–450)
POTASSIUM SERPL-SCNC: 3.9 MMOL/L (ref 3.5–5.1)
PROT SERPL-MCNC: 8 G/DL (ref 6.4–8.2)
RBC # BLD AUTO: 4.84 X10(6)UL
SODIUM SERPL-SCNC: 139 MMOL/L (ref 136–145)
WBC # BLD AUTO: 5.9 X10(3) UL (ref 4–11)

## 2023-04-13 PROCEDURE — 3008F BODY MASS INDEX DOCD: CPT | Performed by: PHYSICIAN ASSISTANT

## 2023-04-13 PROCEDURE — 99214 OFFICE O/P EST MOD 30 MIN: CPT | Performed by: PHYSICIAN ASSISTANT

## 2023-04-13 PROCEDURE — 85025 COMPLETE CBC W/AUTO DIFF WBC: CPT | Performed by: PHYSICIAN ASSISTANT

## 2023-04-13 PROCEDURE — 74177 CT ABD & PELVIS W/CONTRAST: CPT | Performed by: PHYSICIAN ASSISTANT

## 2023-04-13 PROCEDURE — 3074F SYST BP LT 130 MM HG: CPT | Performed by: PHYSICIAN ASSISTANT

## 2023-04-13 PROCEDURE — 82565 ASSAY OF CREATININE: CPT

## 2023-04-13 PROCEDURE — 3078F DIAST BP <80 MM HG: CPT | Performed by: PHYSICIAN ASSISTANT

## 2023-04-13 PROCEDURE — 80053 COMPREHEN METABOLIC PANEL: CPT | Performed by: PHYSICIAN ASSISTANT

## 2023-04-13 PROCEDURE — 83690 ASSAY OF LIPASE: CPT | Performed by: PHYSICIAN ASSISTANT

## 2023-04-13 RX ORDER — PREDNISONE 20 MG/1
40 TABLET ORAL DAILY
Qty: 10 TABLET | Refills: 0 | Status: SHIPPED | OUTPATIENT
Start: 2023-04-13 | End: 2023-04-18

## 2023-04-17 ENCOUNTER — APPOINTMENT (OUTPATIENT)
Dept: PHYSICAL THERAPY | Age: 23
End: 2023-04-17
Attending: PHYSICAL MEDICINE & REHABILITATION
Payer: COMMERCIAL

## 2023-04-19 ENCOUNTER — OFFICE VISIT (OUTPATIENT)
Dept: PHYSICAL THERAPY | Age: 23
End: 2023-04-19
Attending: PHYSICAL MEDICINE & REHABILITATION
Payer: COMMERCIAL

## 2023-04-19 ENCOUNTER — TELEPHONE (OUTPATIENT)
Dept: PHYSICAL THERAPY | Facility: HOSPITAL | Age: 23
End: 2023-04-19

## 2023-04-19 DIAGNOSIS — M79.10 MYALGIA: ICD-10-CM

## 2023-04-19 PROCEDURE — 97161 PT EVAL LOW COMPLEX 20 MIN: CPT | Performed by: PHYSICAL THERAPIST

## 2023-04-19 PROCEDURE — 97530 THERAPEUTIC ACTIVITIES: CPT | Performed by: PHYSICAL THERAPIST

## 2023-04-27 ENCOUNTER — OFFICE VISIT (OUTPATIENT)
Dept: PHYSICAL THERAPY | Age: 23
End: 2023-04-27
Attending: PHYSICAL MEDICINE & REHABILITATION
Payer: COMMERCIAL

## 2023-04-27 PROCEDURE — 97110 THERAPEUTIC EXERCISES: CPT | Performed by: PHYSICAL THERAPIST

## 2023-04-27 NOTE — PROGRESS NOTES
Dx: Myalgia (M79.10)             Authorized # of Visits:  8  Fall Risk: standard         Precautions: n/a             Subjective:   Pain is mostly in low back. There is also some left hip/knee pain. Current Pain Ratin/10  Objective:   No gait deviation, no difficulty with position changes  Flexed sitting/sitting with lordosis - central LBP with lordosis   Lateral step up/down 6 inch step good pelvic control - no knee pain  Leg load left LE/bent knee fallouts - lumbar pain, reduction in pain with bracing prior to movement  Treatment session included review of stability/mobility principles, HEP additions as noted     Assessment:   Lumbar pain is slightly improved overall with postural correction, there is central LBP. Pain most likely secondary to preference for slumped sitting. Improved timing of stabilizers improved lumbar pain with hip mobility. Patient appeared to have good understanding of HEP additions     Plan:   PT 2x/wk progressive exercise as tolerated      Date: 2023  Tx#:  Date: Tx#: 3/ Date: Tx#: 4/ Date: Tx#: 5/ Date: Tx#: 6/ Date: Tx#: 7/ Date: Tx#: 8/   TherEx TherEx TherEx TherEx TherEx TherEx TherEx   Elliptical 6 minutes          Braiding - lumbar pain, high knees, ankle mobility, closed chain hip mobility 10 each ankle and hip          Lateral step up/down on 6 inch step 10 reps each         Knees to chest with SB 20 reps          SL thoracic rotation - changed to rib grasp as there is increased shoulder motion compared with thoracic mobility          Bent knee fallouts decreased pain with bracing via UE (HEP)         Leg load decreased pain with cues to maintain lumbar position (HEP)         Pec stretch 4 reps x 20 seconds                        Charges:  TherEx 3        Total Timed Treatment: 50 min  Total Treatment Time: 50 min

## 2023-05-09 ENCOUNTER — TELEPHONE (OUTPATIENT)
Dept: PHYSICAL THERAPY | Facility: HOSPITAL | Age: 23
End: 2023-05-09

## 2023-05-10 ENCOUNTER — OFFICE VISIT (OUTPATIENT)
Dept: PHYSICAL THERAPY | Age: 23
End: 2023-05-10
Attending: PHYSICAL MEDICINE & REHABILITATION
Payer: COMMERCIAL

## 2023-05-10 PROCEDURE — 97110 THERAPEUTIC EXERCISES: CPT | Performed by: PHYSICAL THERAPIST

## 2023-05-10 NOTE — PROGRESS NOTES
Dx: Myalgia (M79.10)             Authorized # of Visits:  8  Fall Risk: standard         Precautions: n/a              Subjective:   Pain in both knees and lower ribs. She has some chest pain only with a deep breath. States she is feeling really pretty good today   Current Pain Rating: 3-4/10  Objective:   Treatment session included HEP additions as noted  Reviewed hip hinge exercise and single leg hinge   Single leg balance with anterior reach left and right LE symmetrical - no pain. Mild patellar pain with posterior reach. Lateral step up/down on 8 inch step - no pain. There is mild left patellar tilt, good overall patellar mobility     Assessment:   Patient was able to perform hip hinge correctly including in quadruped. Knee pain is not impacting ambulation, good pelvic control on 8 inch step. Will continue to monitor knee pain and progress accordingly. No c/o chest/rib pain with exercises reported     Plan:   PT 2x/wk progressive exercise as tolerated      Date: 4/27/2023  Tx#: 2/8 Date: 5/10/2023  Tx#: 3/8 Date: Tx#: 4/ Date: Tx#: 5/ Date: Tx#: 6/ Date: Tx#: 7/ Date:    Tx#: 8/   TherEx TherEx TherEx TherEx TherEx TherEx TherEx   Elliptical 6 minutes  Elliptical 8 minutes         Braiding - lumbar pain, high knees, ankle mobility, closed chain hip mobility 10 each ankle and hip  Single leg balance anterior reach 5 reps x 2 each         Lateral step up/down on 6 inch step 10 reps each Single leg balance with posterior reach 5 reps each         Knees to chest with SB 20 reps  Lateral step up/down on 8 inch step 10 reps each        SL thoracic rotation - changed to rib grasp as there is increased shoulder motion compared with thoracic mobility  TRX squats 20 reps         Bent knee fallouts decreased pain with bracing via UE (HEP) Hip adduction SL 15 reps each add to HEP        Leg load decreased pain with cues to maintain lumbar position (HEP) IT stretch in standing add to HEP         Pec stretch 4 reps x 20 seconds   Hip hinge bilat and single leg stance                       Charges:  TherEx 3        Total Timed Treatment: 45 min  Total Treatment Time: 45 min

## 2023-05-17 ENCOUNTER — OFFICE VISIT (OUTPATIENT)
Dept: PHYSICAL THERAPY | Age: 23
End: 2023-05-17
Attending: PHYSICAL MEDICINE & REHABILITATION
Payer: COMMERCIAL

## 2023-05-17 PROCEDURE — 97110 THERAPEUTIC EXERCISES: CPT | Performed by: PHYSICAL THERAPIST

## 2023-05-18 NOTE — PROGRESS NOTES
Dx: Myalgia (M79.10)             Authorized # of Visits:  8  Fall Risk: standard         Precautions: n/a              Subjective:   No knee pain. Current Pain Ratin/10  Objective:   Pain right lateral ribs. Lat stretch right tighter than left. Lateral rib pain decreased following thoracic PAS  Demonstrates proper hip hinge - no pain    Assessment:   Patient responded well to treatment. Knee pain appears to have resolved. Plan:   PT 2x/wk progressive exercise as tolerated      Date: 2023  Tx#:  Date: 5/10/2023  Tx#: 3/8 Date: 2023  Tx#:  Date: Tx#: 5/ Date: Tx#: 6/ Date: Tx#: 7/ Date: Tx#: 8/   TherEx TherEx TherEx TherEx TherEx TherEx TherEx   Elliptical 6 minutes  Elliptical 8 minutes  Elliptical 10 minutes        Braiding - lumbar pain, high knees, ankle mobility, closed chain hip mobility 10 each ankle and hip  Single leg balance anterior reach 5 reps x 2 each  Quadruped lat stretch  (HEP)       Lateral step up/down on 6 inch step 10 reps each Single leg balance with posterior reach 5 reps each  SL thoracic rotation 12 reps each        Knees to chest with SB 20 reps  Lateral step up/down on 8 inch step 10 reps each Knees to chest with SB 20 reps        SL thoracic rotation - changed to rib grasp as there is increased shoulder motion compared with thoracic mobility  TRX squats 20 reps  Leg load left - right lumbar pain        Bent knee fallouts decreased pain with bracing via UE (HEP) Hip adduction SL 15 reps each add to HEP Foam roller pec stretch 5 reps upper cervical mobility 5 second hold        Leg load decreased pain with cues to maintain lumbar position (HEP) IT stretch in standing add to HEP  Thoracic mobility on foam roller        Pec stretch 4 reps x 20 seconds   Hip hinge bilat and single leg stance   Hip hinge -demonstrated proper form                      Charges:  TherEx 3        Total Timed Treatment: 45 min  Total Treatment Time: 45 min

## 2023-05-26 ENCOUNTER — TELEPHONE (OUTPATIENT)
Dept: PHYSICAL THERAPY | Facility: HOSPITAL | Age: 23
End: 2023-05-26

## 2023-05-30 ENCOUNTER — TELEPHONE (OUTPATIENT)
Dept: PHYSICAL THERAPY | Facility: HOSPITAL | Age: 23
End: 2023-05-30

## 2023-05-30 ENCOUNTER — APPOINTMENT (OUTPATIENT)
Dept: PHYSICAL THERAPY | Age: 23
End: 2023-05-30
Attending: PHYSICAL MEDICINE & REHABILITATION
Payer: COMMERCIAL

## 2023-06-01 ENCOUNTER — APPOINTMENT (OUTPATIENT)
Dept: PHYSICAL THERAPY | Age: 23
End: 2023-06-01
Attending: PHYSICAL MEDICINE & REHABILITATION
Payer: COMMERCIAL

## 2023-06-01 ENCOUNTER — TELEPHONE (OUTPATIENT)
Dept: PHYSICAL THERAPY | Facility: HOSPITAL | Age: 23
End: 2023-06-01

## 2023-06-06 ENCOUNTER — OFFICE VISIT (OUTPATIENT)
Dept: PHYSICAL THERAPY | Age: 23
End: 2023-06-06
Attending: PHYSICAL MEDICINE & REHABILITATION
Payer: COMMERCIAL

## 2023-06-06 PROCEDURE — 97110 THERAPEUTIC EXERCISES: CPT | Performed by: PHYSICAL THERAPIST

## 2023-06-06 NOTE — PROGRESS NOTES
Dx: Myalgia (M79.10)             Authorized # of Visits:  8  Fall Risk: standard         Precautions: n/a              Subjective:   Rib pain right > left   Current Pain Ratin/10  Objective:   ROM WNL. Pain most notable at left peiscap region with thoracic mobility. Cervical ROM did not impact pain  No pain with scapular MMT - good strength     Assessment:   Patient demonstrates good understanding of postural alignment. Right side oblique pain was not provoked with mobility exercises. Next session will assess tolerance with added resistance. Patient appears to be doing well overall. There are currently no c/o knee pain    Plan:   PT 2x/wk progressive exercise as tolerated      Date: 2023  Tx#:  Date: 5/10/2023  Tx#: 3/8 Date: 2023  Tx#:  Date: 2023  Tx#:  Date: Tx#: 6/ Date: Tx#: / Date:    Tx#: 8/   TherEx TherEx TherEx TherEx TherEx TherEx TherEx   Elliptical 6 minutes  Elliptical 8 minutes  Elliptical 10 minutes  Elliptical 10 minutes       Braiding - lumbar pain, high knees, ankle mobility, closed chain hip mobility 10 each ankle and hip  Single leg balance anterior reach 5 reps x 2 each  Quadruped lat stretch  (HEP) Forward step with bilateral overhead reach 5 reps each      Lateral step up/down on 6 inch step 10 reps each Single leg balance with posterior reach 5 reps each  SL thoracic rotation 12 reps each  Forward step with same side bilat reach 5 reps each       Knees to chest with SB 20 reps  Lateral step up/down on 8 inch step 10 reps each Knees to chest with SB 20 reps  Frontal plane mobility bilat overhead  5 reps each, on wall > 10 reps       SL thoracic rotation - changed to rib grasp as there is increased shoulder motion compared with thoracic mobility  TRX squats 20 reps  Leg load left - right lumbar pain  Quadruped rock with flexion and hip hinge      Bent knee fallouts decreased pain with bracing via UE (HEP) Hip adduction SL 15 reps each add to HEP Foam roller pec stretch 5 reps upper cervical mobility 5 second hold  Prone MT 20 reps       Leg load decreased pain with cues to maintain lumbar position (HEP) IT stretch in standing add to HEP  Thoracic mobility on foam roller  LTR 10 reps       Pec stretch 4 reps x 20 seconds   Hip hinge bilat and single leg stance   Hip hinge -demonstrated proper form   Leg load 5 reps each          Cervical PROM, soft tissue release bilat UT - no pain          Seated thoracic rotation 5 reps each, left emily scap pain with slumped sitting          Charges:  TherEx 3        Total Timed Treatment: 45 min  Total Treatment Time: 45 min

## 2023-06-08 ENCOUNTER — APPOINTMENT (OUTPATIENT)
Dept: PHYSICAL THERAPY | Age: 23
End: 2023-06-08
Attending: PHYSICAL MEDICINE & REHABILITATION
Payer: COMMERCIAL

## 2023-06-19 ENCOUNTER — OFFICE VISIT (OUTPATIENT)
Dept: PHYSICAL THERAPY | Age: 23
End: 2023-06-19
Attending: PHYSICAL MEDICINE & REHABILITATION
Payer: COMMERCIAL

## 2023-06-19 PROCEDURE — 97110 THERAPEUTIC EXERCISES: CPT | Performed by: PHYSICAL THERAPIST

## 2023-06-19 NOTE — PROGRESS NOTES
Dx: Myalgia (M79.10)             Authorized # of Visits:  8  Fall Risk: standard         Precautions: n/a              Subjective:    Reports breathing has been a bit of an issue due to poor air quality. Patient out of town on the weekend and was able to walk on a beach  Current Pain Rating: 3/10  Right knee. Objective:   No gait deviation. Mild right knee pain lateral step up/down on 6 inch step and with TRX squats. Right tibial torsion/ankle tightness > left   HEP additions as noted     Assessment:   Patient tolerated exercise well. There was good recognition of location of joint alignment with use of visual cues. In addition to HEP additions, encouraged patient to continue with scap work to facilitate good postural alignment  Plan:   PT 2 more sessions. Review current HEP and make additions if needed     Date: 4/27/2023  Tx#: 2/8 Date: 5/10/2023  Tx#: 3/8 Date: 5/17/2023  Tx#: 4/8 Date: 6/6/2023  Tx#: 5/8 Date: 6/19/2023  Tx#: 6/8 Date: Tx#: 7/ Date:    Tx#: 8/   TherEx TherEx TherEx TherEx TherEx TherEx TherEx   Elliptical 6 minutes  Elliptical 8 minutes  Elliptical 10 minutes  Elliptical 10 minutes  Elliptical 10 minutes      Braiding - lumbar pain, high knees, ankle mobility, closed chain hip mobility 10 each ankle and hip  Single leg balance anterior reach 5 reps x 2 each  Quadruped lat stretch  (HEP) Forward step with bilateral overhead reach 5 reps each Lateral step up/down on 6 inch step 10 reps x 2 each     Lateral step up/down on 6 inch step 10 reps each Single leg balance with posterior reach 5 reps each  SL thoracic rotation 12 reps each  Forward step with same side bilat reach 5 reps each  Ankle DF tandem stand 15 reps each     Knees to chest with SB 20 reps  Lateral step up/down on 8 inch step 10 reps each Knees to chest with SB 20 reps  Frontal plane mobility bilat overhead  5 reps each, on wall > 10 reps  BAPS L3 cw/ccw 10-12 reps each      SL thoracic rotation - changed to rib grasp as there is increased shoulder motion compared with thoracic mobility  TRX squats 20 reps  Leg load left - right lumbar pain  Quadruped rock with flexion and hip hinge Quad stretches 2 reps x 20 seconds each     Bent knee fallouts decreased pain with bracing via UE (HEP) Hip adduction SL 15 reps each add to HEP Foam roller pec stretch 5 reps upper cervical mobility 5 second hold  Prone MT 20 reps  TRX squats > 20 reps      Leg load decreased pain with cues to maintain lumbar position (HEP) IT stretch in standing add to HEP  Thoracic mobility on foam roller  LTR 10 reps  Hip IR/ER supine 20 reps each (HEP)     Pec stretch 4 reps x 20 seconds   Hip hinge bilat and single leg stance   Hip hinge -demonstrated proper form   Leg load 5 reps each  Prone hip extension leg straight and knee flexed 5 reps x 2 each added prone hip extension to (HEP)        Cervical PROM, soft tissue release bilat UT - no pain  Lateral walk blue band below knees (HEP)        Seated thoracic rotation 5 reps each, left emily scap pain with slumped sitting Standing hip IR with bilat UE  (HEP)         Charges:  TherEx 3        Total Timed Treatment: 50 min  Total Treatment Time: 50 min

## 2023-06-20 ENCOUNTER — TELEPHONE (OUTPATIENT)
Dept: PHYSICAL THERAPY | Facility: HOSPITAL | Age: 23
End: 2023-06-20

## 2023-06-27 ENCOUNTER — APPOINTMENT (OUTPATIENT)
Dept: PHYSICAL THERAPY | Age: 23
End: 2023-06-27
Attending: PHYSICAL MEDICINE & REHABILITATION
Payer: COMMERCIAL

## 2023-06-27 ENCOUNTER — TELEPHONE (OUTPATIENT)
Dept: PHYSICAL THERAPY | Facility: HOSPITAL | Age: 23
End: 2023-06-27

## 2023-06-30 ENCOUNTER — TELEPHONE (OUTPATIENT)
Dept: PHYSICAL THERAPY | Facility: HOSPITAL | Age: 23
End: 2023-06-30

## 2023-07-03 ENCOUNTER — OFFICE VISIT (OUTPATIENT)
Dept: PHYSICAL THERAPY | Age: 23
End: 2023-07-03
Attending: PHYSICAL MEDICINE & REHABILITATION
Payer: COMMERCIAL

## 2023-07-03 PROCEDURE — 97110 THERAPEUTIC EXERCISES: CPT | Performed by: PHYSICAL THERAPIST

## 2023-07-03 NOTE — PROGRESS NOTES
Dx: Myalgia (M79.10)             Authorized # of Visits:  8  Fall Risk: standard         Precautions: n/a              Subjective:   Reports she was able to walk 60 minutes today. Current Pain Ratin/10   Objective:   Mild right knee ecchymosis -(indicates dog ran into her leg)    Assessment:   No pain provoked. There is fatigue/difficulty with LT exercise. FH head posture impacts performance. Overall, patient is doing well       Plan:   PT one more session     Date: 2023  Tx#:  Date: 5/10/2023  Tx#: 3/8 Date: 2023  Tx#:  Date: 2023  Tx#:  Date: 2023  Tx#:  Date: 7/3/2023   Tx#:  Date:    Tx#: 8/   TherEx TherEx TherEx TherEx TherEx TherEx TherEx   Elliptical 6 minutes  Elliptical 8 minutes  Elliptical 10 minutes  Elliptical 10 minutes  Elliptical 10 minutes  Elliptical 10 minutes     Braiding - lumbar pain, high knees, ankle mobility, closed chain hip mobility 10 each ankle and hip  Single leg balance anterior reach 5 reps x 2 each  Quadruped lat stretch  (HEP) Forward step with bilateral overhead reach 5 reps each Lateral step up/down on 6 inch step 10 reps x 2 each TRX retraction 10 reps x 4     Lateral step up/down on 6 inch step 10 reps each Single leg balance with posterior reach 5 reps each  SL thoracic rotation 12 reps each  Forward step with same side bilat reach 5 reps each  Ankle DF tandem stand 15 reps each Hip hinge single leg balance > 10 reps each     Knees to chest with SB 20 reps  Lateral step up/down on 8 inch step 10 reps each Knees to chest with SB 20 reps  Frontal plane mobility bilat overhead  5 reps each, on wall > 10 reps  BAPS L3 cw/ccw 10-12 reps each  Cable column pulldown 60 pounds 10 reps x 3     SL thoracic rotation - changed to rib grasp as there is increased shoulder motion compared with thoracic mobility  TRX squats 20 reps  Leg load left - right lumbar pain  Quadruped rock with flexion and hip hinge Quad stretches 2 reps x 20 seconds each Cable chop 60 pounds 20 reps each     Bent knee fallouts decreased pain with bracing via UE (HEP) Hip adduction SL 15 reps each add to HEP Foam roller pec stretch 5 reps upper cervical mobility 5 second hold  Prone MT 20 reps  TRX squats > 20 reps  Cable column lift 48 pounds 20 reps each     Leg load decreased pain with cues to maintain lumbar position (HEP) IT stretch in standing add to HEP  Thoracic mobility on foam roller  LTR 10 reps  Hip IR/ER supine 20 reps each (HEP) Prone LT 20 reps each     Pec stretch 4 reps x 20 seconds   Hip hinge bilat and single leg stance   Hip hinge -demonstrated proper form   Leg load 5 reps each  Prone hip extension leg straight and knee flexed 5 reps x 2 each added prone hip extension to (HEP) Prone MT 20 reps with 3 second hold        Cervical PROM, soft tissue release bilat UT - no pain  Lateral walk blue band below knees (HEP) Hamstring stretch 4 reps x 20 seconds each        Seated thoracic rotation 5 reps each, left emily scap pain with slumped sitting Standing hip IR with bilat UE  (HEP)         Charges:  TherEx 3        Total Timed Treatment: 50 min  Total Treatment Time: 50 min

## 2023-07-11 ENCOUNTER — APPOINTMENT (OUTPATIENT)
Dept: PHYSICAL THERAPY | Age: 23
End: 2023-07-11
Attending: PHYSICAL MEDICINE & REHABILITATION
Payer: COMMERCIAL

## 2023-07-11 ENCOUNTER — TELEPHONE (OUTPATIENT)
Dept: PHYSICAL THERAPY | Facility: HOSPITAL | Age: 23
End: 2023-07-11

## 2023-07-18 ENCOUNTER — APPOINTMENT (OUTPATIENT)
Dept: PHYSICAL THERAPY | Age: 23
End: 2023-07-18
Attending: PHYSICAL MEDICINE & REHABILITATION
Payer: COMMERCIAL

## 2023-07-27 ENCOUNTER — PATIENT MESSAGE (OUTPATIENT)
Dept: FAMILY MEDICINE CLINIC | Facility: CLINIC | Age: 23
End: 2023-07-27

## 2023-07-27 RX ORDER — SODIUM FLUORIDE 6 MG/ML
PASTE, DENTIFRICE DENTAL
Qty: 100 ML | Refills: 2 | Status: SHIPPED | OUTPATIENT
Start: 2023-07-27

## 2023-08-01 RX ORDER — SODIUM FLUORIDE 6 MG/ML
PASTE, DENTIFRICE DENTAL
Qty: 100 ML | Refills: 2 | Status: SHIPPED | OUTPATIENT
Start: 2023-08-01

## 2023-08-15 ENCOUNTER — OFFICE VISIT (OUTPATIENT)
Dept: PHYSICAL THERAPY | Age: 23
End: 2023-08-15
Attending: PHYSICAL MEDICINE & REHABILITATION
Payer: COMMERCIAL

## 2023-08-15 PROCEDURE — 97110 THERAPEUTIC EXERCISES: CPT | Performed by: PHYSICAL THERAPIST

## 2023-08-15 NOTE — PROGRESS NOTES
Dx: Myalgia (M79.10)             Authorized # of Visits:  8  Fall Risk: standard         Precautions: n/a              Subjective:     Current Pain Ratin/10 chest area, no knee pain  Objective:   Cervical ROM WNL   Bilat strength 5/5 with MMT   HEP additions as noted     Assessment:   Patient appears to be doing well overall.  She will continue with 1-2 more sessions secondary to lapse of care during her illness       Plan:   As above     Date: 2023  Tx#:  Date: 5/10/2023  Tx#: 3/8 Date: 2023  Tx#:  Date: 2023  Tx#:  Date: 2023  Tx#:  Date: 7/3/2023   Tx#:  Date: 8/15/2023   Tx#: 8/   TherEx TherEx TherEx TherEx TherEx TherEx TherEx   Elliptical 6 minutes  Elliptical 8 minutes  Elliptical 10 minutes  Elliptical 10 minutes  Elliptical 10 minutes  Elliptical 10 minutes  Elliptical 8 minutes   Braiding - lumbar pain, high knees, ankle mobility, closed chain hip mobility 10 each ankle and hip  Single leg balance anterior reach 5 reps x 2 each  Quadruped lat stretch  (HEP) Forward step with bilateral overhead reach 5 reps each Lateral step up/down on 6 inch step 10 reps x 2 each TRX retraction 10 reps x 4  Doorway stretch instruction (HEP)     Lateral step up/down on 6 inch step 10 reps each Single leg balance with posterior reach 5 reps each  SL thoracic rotation 12 reps each  Forward step with same side bilat reach 5 reps each  Ankle DF tandem stand 15 reps each Hip hinge single leg balance > 10 reps each  First rib mob (HEP)   Knees to chest with SB 20 reps  Lateral step up/down on 8 inch step 10 reps each Knees to chest with SB 20 reps  Frontal plane mobility bilat overhead  5 reps each, on wall > 10 reps  BAPS L3 cw/ccw 10-12 reps each  Cable column pulldown 60 pounds 10 reps x 3  Levator/UT stretch (HEP)   SL thoracic rotation - changed to rib grasp as there is increased shoulder motion compared with thoracic mobility  TRX squats 20 reps  Leg load left - right lumbar pain Quadruped rock with flexion and hip hinge Quad stretches 2 reps x 20 seconds each Cable chop 60 pounds 20 reps each  Prone MT 10 reps x 2    Bent knee fallouts decreased pain with bracing via UE (HEP) Hip adduction SL 15 reps each add to HEP Foam roller pec stretch 5 reps upper cervical mobility 5 second hold  Prone MT 20 reps  TRX squats > 20 reps  Cable column lift 48 pounds 20 reps each  Bilat pec stretch 4 reps x 20 seconds    Leg load decreased pain with cues to maintain lumbar position (HEP) IT stretch in standing add to HEP  Thoracic mobility on foam roller  LTR 10 reps  Hip IR/ER supine 20 reps each (HEP) Prone LT 20 reps each  SL thoracic mobility 12 reps each   Pec stretch 4 reps x 20 seconds   Hip hinge bilat and single leg stance   Hip hinge -demonstrated proper form   Leg load 5 reps each  Prone hip extension leg straight and knee flexed 5 reps x 2 each added prone hip extension to (HEP) Prone MT 20 reps with 3 second hold  Cervical PROM, suboccipital release       Cervical PROM, soft tissue release bilat UT - no pain  Lateral walk blue band below knees (HEP) Hamstring stretch 4 reps x 20 seconds each        Seated thoracic rotation 5 reps each, left emily scap pain with slumped sitting Standing hip IR with bilat UE  (HEP)         Charges:  TherEx 3        Total Timed Treatment: 45 min  Total Treatment Time: 45 min

## 2023-08-21 ENCOUNTER — OFFICE VISIT (OUTPATIENT)
Dept: PHYSICAL THERAPY | Age: 23
End: 2023-08-21
Attending: PHYSICAL MEDICINE & REHABILITATION
Payer: COMMERCIAL

## 2023-08-21 PROCEDURE — 97110 THERAPEUTIC EXERCISES: CPT | Performed by: PHYSICAL THERAPIST

## 2023-08-21 NOTE — PROGRESS NOTES
Dx: Myalgia (M79.10)             Authorized # of Visits:  8  Fall Risk: standard         Precautions: n/a            Discharge Summary  Pt has attended 9 visits in Physical Therapy. Subjective:   Patient states she is having some right knee pain again after walking her neighbor's dog. She states the dog tends to pull a lot. She states she may also not feel as good as the air was not good quality   Objective:   Gait: no deviation  Cervical ROM WNL. Posture: slight forward head  Bilat strength 5/5 with MMT. Demonstrates lift/carry minimum of 20 pound. Demonstrates overhead lift minimum of 8 pounds   LE strength: 5/5. Squat - no pain   UE/LE ROM: WNL        Assessment:   Patient is ready for discharge. There are no strength/ROM deficits noted. She has been instructed in HEP and postural corrections. She demonstrates proper lifting mechanics and had no pain with lifting minimum of 20 pounds Recommend the patient remain active with cardiovascular exercise of her preference. She does express some interest in PRE. Precautions with PRE have been reviewed. Patient appeared to have good understanding of postural correction and the need to continue regular exercise. Goals: (to be met in 8 visits)  No difficulty donning/doffing shoes/socks - Met  No difficulty with getting in/out of a car - Met  Patient will report reduction in knee pain with performing ADLS  - had been Met. Pain returned with dog walking   No difficulty lifting/carrying minimum of 20 pounds - Met       Post LEFS Score  Post LEFS Score: 91.25 % (8/21/2023 11:05 AM)    57.5 % improvement    Plan: Discontinue PT         Thank you for your referral. If you have any questions, please contact me at Dept: [phone]. Sincerely,  Electronically signed by therapist: Koko Raines PT     Physician's certification required:  No  Please co-sign or sign and return this letter via fax as soon as possible to [phone].    I certify the need for these services furnished under this plan of treatment and while under my care.     X___________________________________________________ Date____________________    Certification From: 2/09/5661  To:11/19/2023    Date: 4/27/2023  Tx#: 2/8 Date: 5/10/2023  Tx#: 3/8 Date: 5/17/2023  Tx#: 4/8 Date: 6/6/2023  Tx#: 5/8 Date: 6/19/2023  Tx#: 6/8 Date: 7/3/2023   Tx#: 7/8 Date: 8/15/2023   Tx#: 8/ Date: 8/21/2023   Tx#: 9   TherEx TherEx TherEx TherEx TherEx TherEx TherEx TherEx   Elliptical 6 minutes  Elliptical 8 minutes  Elliptical 10 minutes  Elliptical 10 minutes  Elliptical 10 minutes  Elliptical 10 minutes  Elliptical 8 minutes Bike L3 13 minutes   Braiding - lumbar pain, high knees, ankle mobility, closed chain hip mobility 10 each ankle and hip  Single leg balance anterior reach 5 reps x 2 each  Quadruped lat stretch  (HEP) Forward step with bilateral overhead reach 5 reps each Lateral step up/down on 6 inch step 10 reps x 2 each TRX retraction 10 reps x 4  Doorway stretch instruction (HEP)   Ankle mobility > 10 reps each   Lateral step up/down on 6 inch step 10 reps each Single leg balance with posterior reach 5 reps each  SL thoracic rotation 12 reps each  Forward step with same side bilat reach 5 reps each  Ankle DF tandem stand 15 reps each Hip hinge single leg balance > 10 reps each  First rib mob (HEP) Single leg hip hinge 5 reps each    Knees to chest with SB 20 reps  Lateral step up/down on 8 inch step 10 reps each Knees to chest with SB 20 reps  Frontal plane mobility bilat overhead  5 reps each, on wall > 10 reps  BAPS L3 cw/ccw 10-12 reps each  Cable column pulldown 60 pounds 10 reps x 3  Levator/UT stretch (HEP) Single leg balance opposite leg swing s 10 reps x 2 each    SL thoracic rotation - changed to rib grasp as there is increased shoulder motion compared with thoracic mobility  TRX squats 20 reps  Leg load left - right lumbar pain  Quadruped rock with flexion and hip hinge Quad stretches 2 reps x 20 seconds each Cable chop 60 pounds 20 reps each  Prone MT 10 reps x 2  8 pound medicine ball chops 10  reps each    Bent knee fallouts decreased pain with bracing via UE (HEP) Hip adduction SL 15 reps each add to HEP Foam roller pec stretch 5 reps upper cervical mobility 5 second hold  Prone MT 20 reps  TRX squats > 20 reps  Cable column lift 48 pounds 20 reps each  Bilat pec stretch 4 reps x 20 seconds  8 pound medicine ball step matrix 30 seconds x 2   Leg load decreased pain with cues to maintain lumbar position (HEP) IT stretch in standing add to HEP  Thoracic mobility on foam roller  LTR 10 reps  Hip IR/ER supine 20 reps each (HEP) Prone LT 20 reps each  SL thoracic mobility 12 reps each 5 pound right and left UE step matrix 30 seconds x 2   Pec stretch 4 reps x 20 seconds   Hip hinge bilat and single leg stance   Hip hinge -demonstrated proper form   Leg load 5 reps each  Prone hip extension leg straight and knee flexed 5 reps x 2 each added prone hip extension to (HEP) Prone MT 20 reps with 3 second hold  Cervical PROM, suboccipital release  Single leg hip hinge 5 reps each      Cervical PROM, soft tissue release bilat UT - no pain  Lateral walk blue band below knees (HEP) Hamstring stretch 4 reps x 20 seconds each   20 pound lift/carry right and left UE       Seated thoracic rotation 5 reps each, left emily scap pain with slumped sitting Standing hip IR with bilat UE  (HEP)   8 pound medicine ball toss > 10 reps, rotation toss > 4 reps        Charges:  TherEx 3        Total Timed Treatment: 45 min  Total Treatment Time: 45 min

## 2023-08-23 ENCOUNTER — APPOINTMENT (OUTPATIENT)
Dept: PHYSICAL THERAPY | Age: 23
End: 2023-08-23
Attending: PHYSICAL MEDICINE & REHABILITATION
Payer: COMMERCIAL

## 2023-09-28 ENCOUNTER — LAB ENCOUNTER (OUTPATIENT)
Dept: LAB | Age: 23
End: 2023-09-28
Attending: PHYSICIAN ASSISTANT
Payer: COMMERCIAL

## 2023-09-28 ENCOUNTER — OFFICE VISIT (OUTPATIENT)
Dept: FAMILY MEDICINE CLINIC | Facility: CLINIC | Age: 23
End: 2023-09-28
Payer: COMMERCIAL

## 2023-09-28 VITALS
OXYGEN SATURATION: 98 % | SYSTOLIC BLOOD PRESSURE: 116 MMHG | DIASTOLIC BLOOD PRESSURE: 70 MMHG | WEIGHT: 148.81 LBS | HEIGHT: 67 IN | BODY MASS INDEX: 23.36 KG/M2 | HEART RATE: 100 BPM | RESPIRATION RATE: 18 BRPM

## 2023-09-28 DIAGNOSIS — Z00.00 GENERAL MEDICAL EXAM: ICD-10-CM

## 2023-09-28 DIAGNOSIS — M94.0 COSTOCHONDRITIS: ICD-10-CM

## 2023-09-28 DIAGNOSIS — G89.29 CHRONIC NONINTRACTABLE HEADACHE, UNSPECIFIED HEADACHE TYPE: Primary | ICD-10-CM

## 2023-09-28 DIAGNOSIS — L70.0 ACNE VULGARIS: ICD-10-CM

## 2023-09-28 DIAGNOSIS — R51.9 CHRONIC NONINTRACTABLE HEADACHE, UNSPECIFIED HEADACHE TYPE: Primary | ICD-10-CM

## 2023-09-28 DIAGNOSIS — M79.7 FIBROMYALGIA: ICD-10-CM

## 2023-09-28 DIAGNOSIS — N93.9 ABNORMAL UTERINE BLEEDING (AUB): ICD-10-CM

## 2023-09-28 LAB
ALBUMIN SERPL-MCNC: 4 G/DL (ref 3.4–5)
ALBUMIN/GLOB SERPL: 1.2 {RATIO} (ref 1–2)
ALP LIVER SERPL-CCNC: 36 U/L
ALT SERPL-CCNC: 33 U/L
ANION GAP SERPL CALC-SCNC: 6 MMOL/L (ref 0–18)
AST SERPL-CCNC: 30 U/L (ref 15–37)
BASOPHILS # BLD AUTO: 0.02 X10(3) UL (ref 0–0.2)
BASOPHILS NFR BLD AUTO: 0.4 %
BILIRUB SERPL-MCNC: 0.4 MG/DL (ref 0.1–2)
BUN BLD-MCNC: 8 MG/DL (ref 7–18)
CALCIUM BLD-MCNC: 9.2 MG/DL (ref 8.5–10.1)
CHLORIDE SERPL-SCNC: 110 MMOL/L (ref 98–112)
CHOLEST SERPL-MCNC: 226 MG/DL (ref ?–200)
CO2 SERPL-SCNC: 23 MMOL/L (ref 21–32)
CREAT BLD-MCNC: 1.03 MG/DL
EGFRCR SERPLBLD CKD-EPI 2021: 78 ML/MIN/1.73M2 (ref 60–?)
EOSINOPHIL # BLD AUTO: 0.08 X10(3) UL (ref 0–0.7)
EOSINOPHIL NFR BLD AUTO: 1.6 %
ERYTHROCYTE [DISTWIDTH] IN BLOOD BY AUTOMATED COUNT: 13.5 %
FASTING PATIENT LIPID ANSWER: NO
FASTING STATUS PATIENT QL REPORTED: NO
GLOBULIN PLAS-MCNC: 3.3 G/DL (ref 2.8–4.4)
GLUCOSE BLD-MCNC: 98 MG/DL (ref 70–99)
HCT VFR BLD AUTO: 38.8 %
HDLC SERPL-MCNC: 53 MG/DL (ref 40–59)
HGB BLD-MCNC: 13.1 G/DL
IMM GRANULOCYTES # BLD AUTO: 0.01 X10(3) UL (ref 0–1)
IMM GRANULOCYTES NFR BLD: 0.2 %
LDLC SERPL CALC-MCNC: 149 MG/DL (ref ?–100)
LYMPHOCYTES # BLD AUTO: 1.18 X10(3) UL (ref 1–4)
LYMPHOCYTES NFR BLD AUTO: 24.3 %
MCH RBC QN AUTO: 29.9 PG (ref 26–34)
MCHC RBC AUTO-ENTMCNC: 33.8 G/DL (ref 31–37)
MCV RBC AUTO: 88.6 FL
MONOCYTES # BLD AUTO: 0.38 X10(3) UL (ref 0.1–1)
MONOCYTES NFR BLD AUTO: 7.8 %
NEUTROPHILS # BLD AUTO: 3.19 X10 (3) UL (ref 1.5–7.7)
NEUTROPHILS # BLD AUTO: 3.19 X10(3) UL (ref 1.5–7.7)
NEUTROPHILS NFR BLD AUTO: 65.7 %
NONHDLC SERPL-MCNC: 173 MG/DL (ref ?–130)
OSMOLALITY SERPL CALC.SUM OF ELEC: 286 MOSM/KG (ref 275–295)
PLATELET # BLD AUTO: 226 10(3)UL (ref 150–450)
POTASSIUM SERPL-SCNC: 3.6 MMOL/L (ref 3.5–5.1)
PROT SERPL-MCNC: 7.3 G/DL (ref 6.4–8.2)
RBC # BLD AUTO: 4.38 X10(6)UL
SODIUM SERPL-SCNC: 139 MMOL/L (ref 136–145)
TRIGL SERPL-MCNC: 132 MG/DL (ref 30–149)
TSI SER-ACNC: 1.36 MIU/ML (ref 0.36–3.74)
VIT B12 SERPL-MCNC: 1762 PG/ML (ref 193–986)
VIT D+METAB SERPL-MCNC: 49.9 NG/ML (ref 30–100)
VLDLC SERPL CALC-MCNC: 25 MG/DL (ref 0–30)
WBC # BLD AUTO: 4.9 X10(3) UL (ref 4–11)

## 2023-09-28 PROCEDURE — 82306 VITAMIN D 25 HYDROXY: CPT | Performed by: PHYSICIAN ASSISTANT

## 2023-09-28 PROCEDURE — 99214 OFFICE O/P EST MOD 30 MIN: CPT | Performed by: PHYSICIAN ASSISTANT

## 2023-09-28 PROCEDURE — 3074F SYST BP LT 130 MM HG: CPT | Performed by: PHYSICIAN ASSISTANT

## 2023-09-28 PROCEDURE — 3078F DIAST BP <80 MM HG: CPT | Performed by: PHYSICIAN ASSISTANT

## 2023-09-28 PROCEDURE — 80050 GENERAL HEALTH PANEL: CPT | Performed by: PHYSICIAN ASSISTANT

## 2023-09-28 PROCEDURE — 3008F BODY MASS INDEX DOCD: CPT | Performed by: PHYSICIAN ASSISTANT

## 2023-09-28 PROCEDURE — 80061 LIPID PANEL: CPT | Performed by: PHYSICIAN ASSISTANT

## 2023-09-28 PROCEDURE — 82607 VITAMIN B-12: CPT | Performed by: PHYSICIAN ASSISTANT

## 2023-09-28 RX ORDER — TRETINOIN 0.5 MG/G
1 CREAM TOPICAL NIGHTLY
Qty: 45 G | Refills: 1 | Status: SHIPPED | OUTPATIENT
Start: 2023-09-28

## 2023-09-28 RX ORDER — METRONIDAZOLE 10 MG/G
1 GEL TOPICAL DAILY
Qty: 30 G | Refills: 1 | Status: SHIPPED | OUTPATIENT
Start: 2023-09-28

## 2023-09-28 RX ORDER — PREDNISONE 10 MG/1
TABLET ORAL
Qty: 18 TABLET | Refills: 0 | Status: SHIPPED | OUTPATIENT
Start: 2023-09-28

## 2023-09-28 NOTE — PROGRESS NOTES
Subjective:   Patient ID: Karen Rivera is a 24year old adult. HPI  Patient presents with multiple concerns:    Having pain along the bottom of the rib cage  Radiates from from to sides  No trauma or injury  Ongoing for the last few days  [facility] - unremarkable  Mom states that the area feels \"fluffy\" that maybe there is fluid accumulating    Started her period on 9/17/23 and still bleeding - 11 days total  Minor cramps  Bleeding is light  This was earlier than she was expecting her period  Also on birth control    Currently getting allergy shots 1-2 times/week  Wonders about flu, covid, RSV vaccines    Also c/o bad patch of acne on the right jaw line/neck  Not responding to any of her topical medications    H/o chronic migraines  Was given sample of nurtec by neurology which worked very [facility] does not cover  Wonders about alternatives      History/Other:   Review of Systems   Constitutional:  Negative for chills, fatigue and fever. HENT:  Negative for congestion, ear pain, rhinorrhea and sore throat. Eyes:  Negative for visual disturbance. Respiratory:  Negative for cough, shortness of breath and wheezing. Cardiovascular:  Negative for chest pain, palpitations and leg swelling. Gastrointestinal:  Negative for abdominal pain, diarrhea, nausea and vomiting. Genitourinary:  Positive for menstrual problem. Negative for dysuria, frequency and hematuria. Musculoskeletal:  Positive for arthralgias (lower rib cage). Negative for gait problem and myalgias. Skin:  Positive for rash. Neurological:  Positive for headaches. Negative for weakness and light-headedness. Hematological:  Negative for adenopathy. Psychiatric/Behavioral:  Negative for dysphoric mood. The patient is not nervous/anxious. Current Outpatient Medications   Medication Sig Dispense Refill    Norethin Ace-Eth Estrad-FE 1-20 MG-MCG Oral Tab Take 1 tablet by mouth daily.       atomoxetine 60 MG Oral Cap Take 1 capsule (60 mg total) by mouth daily. 90 capsule 1    lamoTRIgine  MG Oral Tablet 24 Hr Take 1 tablet (100 mg total) by mouth in the morning and 1 tablet (100 mg total) before bedtime. 180 tablet 0    QUEtiapine 50 MG Oral Tab Take 1 tablet (50 mg total) by mouth nightly. 90 tablet 0    PREVIDENT 5000 BOOSTER PLUS 1.1 % Dental Paste Use as direct 100 mL 2    fluticasone propionate 50 MCG/ACT Nasal Suspension       ketoconazole 2 % External Shampoo Massage thoroughly to scalp and leave 3-5 minutes before rinsing. Use every other wash. 120 mL 3    fluocinonide 0.05 % External Ointment       hydrocortisone 2.5 % External Ointment APPLY TO EYELID TWICE DAILY FOR NO MORE THAN 2 WEEKS AT A TIME      traMADol 50 MG Oral Tab Take 1 tablet (50 mg total) by mouth every 6 (six) hours as needed for Pain. 30 tablet 0    KETOROLAC TROMETHAMINE 10 MG Oral Tab TAKE 1 TABLET(10 MG) BY MOUTH EVERY 6 HOURS AS NEEDED FOR PAIN 15 tablet 2    Nebulizer Does not apply Misc Dx: asthma wheezing 1 each 0    gabapentin 300 MG Oral Cap Take 1 capsule (300 mg total) by mouth every evening. 10 capsule 0    clindamycin 1 % External Lotion       albuterol 108 (90 Base) MCG/ACT Inhalation Aero Soln Inhale into the lungs every 6 (six) hours as needed for Wheezing. Magnesium 400 MG Oral Cap Take by mouth. EPINEPHrine 0.3 MG/0.3ML Injection Solution Auto-injector Take 1 Bottle by mouth As Directed. Melatonin 10 MG Oral Cap Take 15 mg by mouth. Cholecalciferol (VITAMIN D3) 250 MCG (16215 UT) Oral Cap Take by mouth. Fexofenadine HCl 180 MG Oral Tab Take 180 mg by mouth daily. Fluticasone Furoate-Vilanterol (BREO ELLIPTA) 200-25 MCG/INH Inhalation Aerosol Powder, Breath Activated Inhale 1 puff into the lungs daily.  1 each 11     Allergies:  Hazelnuts               DIARRHEA, HIVES, NAUSEA AND                            VOMITING, Tightness in Throat  Sweet Potato            HIVES, NAUSEA AND VOMITING, Tightness in Throat    Objective:   Physical Exam  Vitals and nursing note reviewed. Constitutional:       General: Patricia Ann is not in acute distress. Appearance: Patricia Ann is well-developed. HENT:      Head: Normocephalic and atraumatic. Right Ear: External ear normal.      Left Ear: External ear normal.      Nose: Nose normal.   Eyes:      Conjunctiva/sclera: Conjunctivae normal.      Pupils: Pupils are equal, round, and reactive to light. Neck:      Thyroid: No thyromegaly. Cardiovascular:      Rate and Rhythm: Normal rate and regular rhythm. Heart sounds: Normal heart sounds. Pulmonary:      Effort: Pulmonary effort is normal.      Breath sounds: Normal breath sounds. No wheezing or rales. Chest:      Chest wall: Tenderness (TTP along the bilateral costal margin) present. Abdominal:      General: Bowel sounds are normal. There is no distension. Palpations: Abdomen is soft. There is no mass. Tenderness: There is no abdominal tenderness. Musculoskeletal:         General: No tenderness. Normal range of motion. Cervical back: Normal range of motion and neck supple. Lymphadenopathy:      Cervical: No cervical adenopathy. Skin:     General: Skin is warm and dry. Findings: Acne (papulopustular acne of forehead and jawline/neck) present. Neurological:      Mental Status: Maggie Peña is alert and oriented to person, place, and time. Psychiatric:         Behavior: Behavior normal.         Assessment & Plan:   1. Chronic nonintractable headache, unspecified headache type  Nurtec was working well but not covered. Switch to [employer] and use as directed. - ubrogepant 50 MG Oral Tab; Take one pill at first onset of pain. Repeat dose in 2 hours if pain persists. Do not exceed 200 mg in 24 hours. Do not use for more than 5 migraines per month. Dispense: 16 tablet; Refill: 0    2. Fibromyalgia  Referral per request to specialists.    - Physiatry Referral - In Network  - Rheumatology Referral - In Network    3. General medical exam  - CBC With Differential With Platelet  - Comp Metabolic Panel (14)  - Lipid Panel  - TSH W Reflex To Free T4; Future  - Vitamin B12  - Vitamin D; Future    4. Acne vulgaris  Not responding to clindamycin. Stop medication and switch to metronidazole and add retin-A. Follow up in 4 weeks. If not improving we could consider oral medication like abx or spironolactone. - metroNIDAZOLE 1 % External Gel; Apply 1 Application topically daily. Dispense: 30 g; Refill: 1  - Tretinoin 0.05 % External Cream; Apply 1 Application topically nightly. Dispense: 45 g; Refill: 1    5. Costochondritis  Pain persisting despite NSAID use. Trial of prednisone taper and ice application. Follow up if not soon better. - predniSONE 10 MG Oral Tab; Take 3 tablets daily for 3 days, then 2 tablets daily for 3 days, then 1 tablet daily for 3 days. Dispense: 18 tablet; Refill: 0    6. Abnormal uterine bleeding (AUB)  Likely multifactorial - lot of stress, recent covid infection, changes to diet and activity. Bleeding is light. Will monitor for now. Discussed signs and symptoms that should warrant prompt medical attention.

## 2023-10-13 ENCOUNTER — OFFICE VISIT (OUTPATIENT)
Dept: PHYSICAL MEDICINE AND REHAB | Facility: CLINIC | Age: 23
End: 2023-10-13
Payer: COMMERCIAL

## 2023-10-13 VITALS
HEIGHT: 67 IN | WEIGHT: 148 LBS | BODY MASS INDEX: 23.23 KG/M2 | SYSTOLIC BLOOD PRESSURE: 110 MMHG | DIASTOLIC BLOOD PRESSURE: 78 MMHG

## 2023-10-13 DIAGNOSIS — M79.7 FIBROMYALGIA: ICD-10-CM

## 2023-10-13 DIAGNOSIS — M94.0 COSTOCHONDRITIS: Primary | ICD-10-CM

## 2023-10-13 PROCEDURE — 3008F BODY MASS INDEX DOCD: CPT | Performed by: PHYSICAL MEDICINE & REHABILITATION

## 2023-10-13 PROCEDURE — 3078F DIAST BP <80 MM HG: CPT | Performed by: PHYSICAL MEDICINE & REHABILITATION

## 2023-10-13 PROCEDURE — 99244 OFF/OP CNSLTJ NEW/EST MOD 40: CPT | Performed by: PHYSICAL MEDICINE & REHABILITATION

## 2023-10-13 PROCEDURE — 3074F SYST BP LT 130 MM HG: CPT | Performed by: PHYSICAL MEDICINE & REHABILITATION

## 2023-10-13 RX ORDER — NAPROXEN 500 MG/1
TABLET ORAL
Qty: 60 TABLET | Refills: 0 | Status: SHIPPED | OUTPATIENT
Start: 2023-10-13

## 2023-10-14 NOTE — H&P
[facility], [address] [place] [address], [address]    History and Physical    [facility] Patient Status:  No patient class for patient encounter    2000 MRN [id number]   Location [facility], [place], [address] Attending No att. providers found   Hosp Day # 0 PCP April Robertson DO     Date:  10/14/2023    History provided by:patient  HPI:   Patient presents with:  New Patient: New patient, referred by Shannon Oliveira PA-C comes in with bilateral chest aching/burning pain and bilateral knee aching pain. Admits numbness in R arm. Denies weakness. Rates pain 3/10. Admits recent imaging thru [facility]. Admits PT with improvement. Denies injections. Symptoms began , states she had a bad concussion  and injured her R arm in  doing track and field. Takes Gabapentin 600mg qNightly. This is a 65-year-old female with past medical history of fibromyalgia, migraine headaches, anxiety who presents with chest, thoracolumbar back pain, bilateral knee pain, right arm numbness essentially from the proximal one third upper arm all the way down to the hand. In high school she developed chest pain without inciting events and noticed that her skin/soft tissue/muscle seem to be overly sensitive. She would notice that if pressure was applied she will continue to feel that sense of pressure to the affected area for about 10 minutes afterwards. In college she began having difficulty standing, sitting, and also developed bilateral anterior knee pain. Throughout this time she would also have intermittent chest pain. She has good and bad days. She was seen by physiatrist Dr. Suresh Segovia who prescribed her gabapentin. At that time most of her pain is in the chest and sternum. The gabapentin did help, and she has been taking 600 mg at nighttime without side effects. She also takes tramadol and ketorolac for severe menstrual cramping.   She also has nonpainful paresthesias in the right upper arm since 2012. She does not have sensation in the right arm. States that when she is cut she does not feel it. She has had [place] [facility] [place] was reported as negative. She has also had more recent MRIs of the cervical and thoracic spine through [facility]; the images are not available to review, the reports are available through [facility]. She more recently saw Dr. Ernie Tan for rib pain; essentially the same management was recommended. She then saw her primary care physician assistant who ordered XR of the ribs, course of prednisone, and referred patient to this clinic for further management. The prednsione did decrease the chest and rib pain to a more manageable level. She has recently completed college, and is waiting on the results of an interview. In terms of previous treatment has done physical therapy which did help; land based. More recently for bilateral knee pain for which she was following with orthopedics. She has not been prescribed any other medication other than the gabapentin, tramadol and ketorolac. Has not done acupuncture or aquatic therapy. No TPI. History     Past Medical History:   Diagnosis Date    Anxiety     ASTHMA     ECZEMA        No past surgical history on file.   Family History   Problem Relation Age of Onset    Asthma Other     Diabetes Maternal Grandmother     High Cholesterol Maternal Grandmother     Depression Maternal Grandmother     Heart Disease Maternal Grandfather     High Blood Pressure Maternal Grandfather     Stroke Maternal Grandfather     Arthritis Maternal Grandfather     Heart Disease Paternal Grandfather     Arthritis Paternal Grandfather     High Blood Pressure Paternal Grandfather     High Cholesterol Father     Depression Brother      Social History:  Social History     Socioeconomic History    Marital status: Single   Tobacco Use    Smoking status: Never    Smokeless tobacco: Never   Vaping Use    Vaping Use: Never used   Substance and Sexual Activity    Alcohol use: No     Alcohol/week: 0.0 standard drinks of alcohol    Drug use: No     Allergies/Medications: Allergies:   Hazelnuts               DIARRHEA, HIVES, NAUSEA AND                            VOMITING, Tightness in Throat  Sweet Potato            HIVES, NAUSEA AND VOMITING,                            Tightness in Throat    Review of Systems:   Patient-reported ROS  Constitutional  Sleep Disturbance: admits  Chills: denies  Fever: denies  Weight Gain: denies  Weight Loss: admits   Cardiovascular  Chest Pain: admits  Irregular Heartbeat: admits   Respiratory  Painful Breathing: denies  Wheezing: denies   Gastrointestinal  Bowel Incontinence: denies  Heartburn: admits  Abdominal Pain: admits  Blood in Stool : denies  Rectal Pain: denies   Hematology  Easy Bruising: admits  Easy Bleeding: denies   Genitourinary  Difficulty Urinating: denies  Bladder Incontinence: denies  Pelvic Pain: admits  Painful Urination: denies   Musculoskeletal  Joint Stiffness: admits  Painful Joints: admits  Tailbone Pain: denies  Swollen Joints: denies   Peripheral Vascular  Swelling of Legs/Feet: denies  Cold Extremities: denies   Skin  Open Sores: denies  Nodules or Lumps: denies  Rash: denies   Neurological  Loss of Strength Since last Visit: denies  Tingling/Numbness: admits  Balance: denies   Psychiatric  Anxiety: admits  Depressed Mood: admits   Physical Exam:   Vital Signs:  Blood pressure 110/78, height 67\", weight 148 lb (67.1 kg), last menstrual period 09/17/2023, not currently breastfeeding. Physical Exam  Vitals and nursing note reviewed. Constitutional:       Appearance: Normal appearance. HENT:      Head: Normocephalic and atraumatic. Eyes:      Conjunctiva/sclera: Conjunctivae normal.   Pulmonary:      Effort: Pulmonary effort is normal.   Chest:      Chest wall: Tenderness present. Musculoskeletal:      Comments:   No neck rash  Cervical extension 50 degrees. Cervical flexion 50 degrees. Cervical rotation to the right 80 degrees. Cervical rotation to the left 80 degrees  Spurling's test right +  5/5 strength in shoulder abduction, elbow flexion, elbow extension, wrist extension, finger flexion, FDI bilaterally  Decreased sensation to LT in the right upper extremity in a nondermatomal distribution (C6-T1 tested dermatomes)  2/4 BUE and LE reflexes  Haas test negative b/l  Babinski test negative b/l  Ankle myoclonus b/l LE negative  Ttp bilateral thoracolumbar paraspinals, along the costochondral junctions, and along the lower ribs bilaterally. No appreciable structural abnormalities about the ribs, lower chest/sternum seen   Neurological:      Mental Status: Patricia Mantilla is alert. Sensory: Sensory deficit present. Motor: No weakness. Deep Tendon Reflexes: Reflexes normal.       Results:   XR ribs 9/27/23 (via [facility]): FINDINGS:AP and oblique views were obtained     There is no displaced right or left-sided rib fracture. There is minimal right apex curvature of the   thoracic spine. There are no other bone abnormalities identified.     =====   IMPRESSION:   1. Unremarkable appearance of bilateral ribs   2. Mild right apex curvature of the thoracic spine     MRI cervical spine 2/2/2023 (via [facility]): FINDINGS:     ALIGNMENT: The cervical vertebral bodies are normal in height and alignment. The craniocervical   junction is intact. BONE MARROW: Bone marrow signal is normal.     SOFT TISSUES: The prevertebral soft tissues are normal.     SPINAL CANAL: The cervical spinal cord is normal in signal and morphology. There is no abnormal   intraspinal enhancement. The cerebellar tonsils are normal in contour and position. DISC LEVELS:     C2-C3: The intervertebral disc is normal.     C3-C4: There is a tiny central disc protrusion. There is no central spinal canal stenosis or   foraminal stenosis. C4-C5: There is a tiny central disc protrusion.  There is no central spinal canal stenosis or   foraminal stenosis. C5-C6: There is a tiny central disc protrusion. There is no central spinal canal stenosis or   foraminal stenosis. C6-C7: The intervertebral disc is normal.     C7-T1: The intervertebral disc is normal.     =====   IMPRESSION:     1. Tiny central disc protrusions at C3-C4, C4-C5, and C5-C6. No central spinal canal stenosis or   foraminal stenosis. 2. No evidence of Chiari I malformation. 3. Normal cervical spinal cord signal.     MRI thoracic spine 2/2/2023 (via [facility]): FINDINGS:     ALIGNMENT: The thoracic vertebral bodies are normal in height and alignment. BONE MARROW: Bone marrow signal is normal.     SOFT TISSUES: The paraspinal soft tissues are normal.     SPINAL CANAL: The thoracic spinal cord is normal in signal and morphology. The conus medullaris   terminates at the L1 level. There is no abnormal intraspinal enhancement. DISC LEVELS: There are tiny central disc protrusions at T4-T5 and T5-T6. The remaining thoracic   intervertebral discs are normal. The facet joints are intact. There is no spinal canal stenosis or   foraminal stenosis.     =====   IMPRESSION:     1. Tiny central disc protrusions at T4-T5 and T5-T6. No thoracic spinal canal stenosis or foraminal   stenosis. 2. Normal thoracic spinal cord signal.     MRI left knee 1/14/22 (via [address] [place] [address]): FINDINGS:     FLUID: Normal amount of fluid in the joint space. No significant abnormal extra articular fluid collection. PATELLOFEMORAL:  Hyaline cartilage is normal. No abnormal development frankly predisposing to maltracking or impingement. The extensor mechanism is normal.     MEDIAL COMPARTMENT:  The meniscus and hyaline cartilage are within normal limits. The MCL is intact. LATERAL COMPARTMENT:  Thin medial extension of the lateral meniscal body (discoid-like variation). Similar appearance in the contralateral knee (reported separately). No convincing tear. The hyaline cartilage is normal.     Mild thickening and attenuation of the fibular collateral ligament at its femoral attachment. The remaining components of the LCL complex and lateral stabilizing structures are within normal limits. CENTRAL STRUCTURES:  The ACL and PCL are normal. Hoffa's fat is within normal limits. REMAINDER OF THE STUDY:  No further significant findings. IMPRESSION: MRI left knee:     1. Low-grade sprain of the fibular collateral ligament. 2. Thin medial extension of the lateral meniscal body represents a discoid-like variation without complication. Similar appearance in the contralateral knee. 3. No further significant MRI findings. MRI right knee 1/13/2022 ([facility]ia [facility])    FINDINGS:   There is no joint effusion. Incidentally noted is mild lobulated joint fluid extending into the popliteus tendon sheath. A punctate focus of fluid is seen in the semimembranosus/medial gastrocnemius bursa. The ACL, PCL, MCL, LCL, popliteus tendon and   extensor mechanism are intact. The medial and lateral menisci appear intact. The joint spaces and articular surfaces appear preserved. No focal chondral defect is identified. The bone marrow and musculature demonstrate normal signal characteristics. IMPRESSION:   Unremarkable MRI of the right knee. Intact cruciate and collateral ligaments. Intact menisci. Assessment/Plan:   1) costochondritis  2) fibromyalgia  3) h/o migraine HA  4) h/o GERD    Recommend naproxen 500mg BID x14 days; stop if develops increased heartburn or GI upset. Continue gabapentin 600mg at bedtime. Referral given for aquatic therapy. Can also consider acupuncture, but pain seems manageable enough to begin aquatic therapy. Discussed regular aerobic activity. Can consider TPI thoracolumbar paraspinals in the future. F/u in 6-8 weeks.      Cameron Purvis DO  10/14/2023

## 2023-10-15 ENCOUNTER — PATIENT MESSAGE (OUTPATIENT)
Dept: FAMILY MEDICINE CLINIC | Facility: CLINIC | Age: 23
End: 2023-10-15

## 2023-10-16 NOTE — TELEPHONE ENCOUNTER
From: Eladio Sanford  To: Tom Blackwell  Sent: 10/15/2023 12:18 PM CDT  Subject: Flu Vaccination    Hello,    I wanted to send proof of my flu vaccine that I received from [facility] on October 7th! I have attached a photo!     Thanks,  Edilma Aragon

## 2023-10-19 ENCOUNTER — PATIENT MESSAGE (OUTPATIENT)
Dept: FAMILY MEDICINE CLINIC | Facility: CLINIC | Age: 23
End: 2023-10-19

## 2023-10-23 NOTE — TELEPHONE ENCOUNTER
From: Dex Going  Sent: 10/20/2023 9:23 AM CDT  To: [facility] Clinical Staff  Subject: Statement of Good Health    Hi,    I received more information and I do not need a TB test, I only need a Statement of Good Health to be signed by my primary care provider! I have attached the form below.     Thanks,  Andrew Avila

## 2023-11-16 ENCOUNTER — PATIENT MESSAGE (OUTPATIENT)
Dept: PHYSICAL MEDICINE AND REHAB | Facility: CLINIC | Age: 23
End: 2023-11-16

## 2023-11-16 DIAGNOSIS — M94.0 COSTOCHONDRITIS: ICD-10-CM

## 2023-11-16 RX ORDER — NAPROXEN 500 MG/1
TABLET ORAL
Qty: 60 TABLET | Refills: 0 | Status: SHIPPED | OUTPATIENT
Start: 2023-11-16

## 2023-11-16 NOTE — TELEPHONE ENCOUNTER
Refill Request    Medication request: naproxen 500 MG Oral Tab EC   Si tab PO BID x2 weeks, then 1 tab PO BID PRN pain. LOV:10/13/2023 Geovanny Garcia DO   Due back to clinic per last office note:  F/u in 6-8 weeks.     NOV: 1/3/2024 Geovanny Garcia DO      [place]/Last ref[place]: 2023 #60

## 2023-11-16 NOTE — TELEPHONE ENCOUNTER
From: Samantha Neff  To: Candelario Crawley  Sent: 11/16/2023 8:55 AM CST  Subject: Naproxen Prescription    Hello,    I was wondering if you would be able to send a prescription to [facility] on [address] for Naproxen, as I think it has really been helping me manage the sharper pains I get in my torso. I take it when needed, and it eases the chest pain immensely.     Thanks,  Riri Villarreal

## 2024-01-03 DIAGNOSIS — N94.6 DYSMENORRHEA: ICD-10-CM

## 2024-01-04 RX ORDER — TRAMADOL HYDROCHLORIDE 50 MG/1
50 TABLET ORAL EVERY 6 HOURS PRN
Qty: 30 TABLET | Refills: 0 | Status: SHIPPED | OUTPATIENT
Start: 2024-01-04

## 2024-01-04 NOTE — TELEPHONE ENCOUNTER
A refill request was received for:  Requested Prescriptions     Pending Prescriptions Disp Refills    traMADol 50 MG Oral Tab 30 tablet 0     Sig: Take 1 tablet (50 mg total) by mouth every 6 (six) hours as needed for Pain.       Last refill date:1/26/2023       Last office visit:9/28/2023     Follow up due:  Future Appointments   Date Time Provider Department Center   1/11/2024  3:00 PM Angelia Jones [facility]   1/23/2024  3:00 PM Rebecca Garg APRN [facility]   1/25/2024  3:00 PM Angelia Jones [facility]   2/1/2024  3:40 PM Sarbjit Pacheco DO [facility]

## 2024-02-01 ENCOUNTER — OFFICE VISIT (OUTPATIENT)
Dept: PHYSICAL MEDICINE AND REHAB | Facility: CLINIC | Age: 24
End: 2024-02-01
Payer: COMMERCIAL

## 2024-02-01 VITALS — HEIGHT: 67 IN | BODY MASS INDEX: 23.23 KG/M2 | HEART RATE: 81 BPM | OXYGEN SATURATION: 99 % | WEIGHT: 148 LBS

## 2024-02-01 DIAGNOSIS — M94.0 COSTOCHONDRITIS: Primary | ICD-10-CM

## 2024-02-01 PROCEDURE — 3008F BODY MASS INDEX DOCD: CPT | Performed by: PHYSICAL MEDICINE & REHABILITATION

## 2024-02-01 PROCEDURE — 99213 OFFICE O/P EST LOW 20 MIN: CPT | Performed by: PHYSICAL MEDICINE & REHABILITATION

## 2024-02-01 RX ORDER — PREDNISONE 20 MG/1
20 TABLET ORAL DAILY
Qty: 7 TABLET | Refills: 0 | Status: SHIPPED | OUTPATIENT
Start: 2024-02-01

## 2024-02-01 NOTE — PROGRESS NOTES
Progress note    C/C:   Chief Complaint   Patient presents with    Follow - Up     LOV 10/13/23 pt ios here for a follow up on Costochondritis as well as pain medication and management. No N/T.  Takes gabapentin nightly and gives relief.  Pain 2/10  .      HPI: 23 year old presents for follow up. Doing quite well. Has been taking naproxen 500mg intermittently, perhaps once every 2 weeks for increased chest/costochondral pain. Also takes gabapentin 600mg at bedtime which seems to help minimize increased episodes of pain. Works as a [employer], walks about 5 miles a day while at work, and has been feeling so well they did not need to pursue physical therapy. Has had quite mild episodes of knee pain without swelling.     Pertinent allergies:   Allergies   Allergen Reactions    Hazelnuts DIARRHEA, HIVES, NAUSEA AND VOMITING and Tightness in Throat    Sweet Potato HIVES, NAUSEA AND VOMITING and Tightness in Throat        Physical exam:  Pulse 81   Ht 67\"   Wt 148 lb (67.1 kg)   LMP 09/17/2023 (Exact Date)   SpO2 99%   BMI 23.18 kg/m²      Ttp lower costochondral junctions    bilateral knee exam  Observation: no appreciable effusions    Range of motion:  Flexion: 120 degrees b/l  Extension: 0 degrees b/l    Palpation:  Medial joint line tenderness: negative b/l  Lateral joint line tenderness: negative b/l  Medial patellar facet tenderness: negative b/l  Lateral patellar facet tenderness: negative b/l    Provocative tests:  Lachman: negative /bl    Imaging: No new imaging to review    Assessment and plan  Costochondritis  Fibromyalgia    Symptoms are quite well controlled, except for instances of increased costochondral pain at the inferior levels of the ribs/sternum. She will take a 7 day course of prednisone 20mg qDaily for that. Continue gabapentin 600mg at bedtime, intermittent use of naproxen 500mg BID PRN pain after she has finished the prednisone. Follow up in 6-12 months, or earlier as needed.     Sarbjit  Tara DO  Physical Medicine and Rehabilitation  [facility]

## 2024-03-06 ENCOUNTER — PATIENT MESSAGE (OUTPATIENT)
Dept: PHYSICAL MEDICINE AND REHAB | Facility: CLINIC | Age: 24
End: 2024-03-06

## 2024-03-06 DIAGNOSIS — M94.0 COSTOCHONDRITIS: ICD-10-CM

## 2024-03-06 DIAGNOSIS — M79.7 FIBROMYALGIA: Primary | ICD-10-CM

## 2024-03-06 RX ORDER — GABAPENTIN 300 MG/1
300 CAPSULE ORAL 2 TIMES DAILY
Qty: 180 CAPSULE | Refills: 2 | Status: SHIPPED | OUTPATIENT
Start: 2024-03-06

## 2024-03-06 NOTE — TELEPHONE ENCOUNTER
From: Soo Moeller  To: Sarbjit Pacheco  Sent: 3/6/2024 10:47 AM CST  Subject: Medication refill    Since I've switched to you from Dr Newell for my fibroyalgia management, would it be alright if you prescribed my daily Gabapentin 300mg moving forward? And I only have two days left, sorry. I'd like to  from the [facility] at [address] [place] [address] in [place]. I've attached a screenshot of Dr. Newell's Rx for 180 day supply.  Thank you very much, Patricia.

## 2024-03-25 RX ORDER — FLUTICASONE PROPIONATE 50 MCG
1 SPRAY, SUSPENSION (ML) NASAL DAILY
Qty: 3 EACH | Refills: 0 | Status: SHIPPED | OUTPATIENT
Start: 2024-03-25

## 2024-05-27 ENCOUNTER — TELEPHONE (OUTPATIENT)
Dept: FAMILY MEDICINE CLINIC | Facility: CLINIC | Age: 24
End: 2024-05-27

## 2024-05-27 DIAGNOSIS — N94.6 SEVERE MENSTRUAL CRAMPS: ICD-10-CM

## 2024-05-28 NOTE — TELEPHONE ENCOUNTER
A refill request was received for:  Requested Prescriptions     Pending Prescriptions Disp Refills    Ketorolac Tromethamine 10 MG Oral Tab 15 tablet 2     Sig: Take 1 tablet (10 mg total) by mouth every 6 (six) hours as needed for Pain.       Last refill date:       Last office visit:9/28/2023    Follow up due:  Future Appointments   Date Time Provider Department Center   7/2/2024 11:45 AM Rebecca Garg APRN [facility]

## 2024-05-29 RX ORDER — KETOROLAC TROMETHAMINE 10 MG/1
10 TABLET, FILM COATED ORAL EVERY 6 HOURS PRN
Qty: 15 TABLET | Refills: 2 | OUTPATIENT
Start: 2024-05-29

## 2024-06-03 RX ORDER — KETOROLAC TROMETHAMINE 10 MG/1
10 TABLET, FILM COATED ORAL EVERY 6 HOURS PRN
Qty: 15 TABLET | Refills: 2 | Status: SHIPPED | OUTPATIENT
Start: 2024-06-03

## 2024-06-03 NOTE — TELEPHONE ENCOUNTER
Reviewed. Last refill in 2022. Advised not to take with any other nsaids or steroid. Used only prn for severe menstrual cramping.

## 2024-06-04 ENCOUNTER — TELEPHONE (OUTPATIENT)
Dept: FAMILY MEDICINE CLINIC | Facility: CLINIC | Age: 24
End: 2024-06-04

## 2024-06-04 NOTE — TELEPHONE ENCOUNTER
Pharmacy question regarding medication.    Ketorolac Tromethamine 10 MG Oral Tab     Pharmacy called and stated medication has a FDA Black Box Warning and that this medication must be preceded by IV or IM   Administration of medication.    Please advise.

## 2024-06-04 NOTE — TELEPHONE ENCOUNTER
ketorolac should only be used for short-term management of moderately severe acute pain (up to 5 days in adults) and only after IV or IM dosing if necessary.    Okay to dispense?

## 2024-06-05 NOTE — TELEPHONE ENCOUNTER
Yes, okay to dispense. She has had this before multiple times and only uses it very sparingly for severe menstrual cramps. I can change to etodolac if that is more preferred per pharmacy.

## 2024-06-25 ENCOUNTER — HOSPITAL ENCOUNTER (OUTPATIENT)
Age: 24
Discharge: HOME | End: 2024-06-25

## 2024-06-25 ENCOUNTER — APPOINTMENT (OUTPATIENT)
Dept: CT IMAGING | Age: 24
End: 2024-06-25
Attending: PHYSICIAN ASSISTANT

## 2024-06-25 VITALS
DIASTOLIC BLOOD PRESSURE: 89 MMHG | HEART RATE: 90 BPM | OXYGEN SATURATION: 98 % | BODY MASS INDEX: 21.82 KG/M2 | TEMPERATURE: 97 F | WEIGHT: 139 LBS | RESPIRATION RATE: 18 BRPM | SYSTOLIC BLOOD PRESSURE: 128 MMHG | HEIGHT: 67 IN

## 2024-06-25 DIAGNOSIS — N30.91 HEMORRHAGIC CYSTITIS: Primary | ICD-10-CM

## 2024-06-25 LAB
#MXD IC: 0.4 X10ˆ3/UL (ref 0.1–1)
B-HCG UR QL: NEGATIVE
BUN BLD-MCNC: 10 MG/DL (ref 7–18)
CHLORIDE BLD-SCNC: 104 MMOL/L (ref 98–112)
CO2 BLD-SCNC: 25 MMOL/L (ref 21–32)
CREAT BLD-MCNC: 0.9 MG/DL
EGFRCR SERPLBLD CKD-EPI 2021: 92 ML/MIN/1.73M2 (ref 60–?)
GLUCOSE BLD-MCNC: 90 MG/DL (ref 70–99)
GLUCOSE UR STRIP-MCNC: NEGATIVE MG/DL
HCT VFR BLD AUTO: 44 %
HCT VFR BLD CALC: 42 %
HGB BLD-MCNC: 14.6 G/DL
ISTAT IONIZED CALCIUM FOR CHEM 8: 1.24 MMOL/L (ref 1.12–1.32)
KETONES UR STRIP-MCNC: 15 MG/DL
LYMPHOCYTES # BLD AUTO: 1.5 X10ˆ3/UL (ref 1–4)
LYMPHOCYTES NFR BLD AUTO: 15.1 %
MCH RBC QN AUTO: 30 PG (ref 26–34)
MCHC RBC AUTO-ENTMCNC: 33.2 G/DL (ref 31–37)
MCV RBC AUTO: 90.3 FL (ref 80–100)
MIXED CELL %: 4.6 %
NEUTROPHILS # BLD AUTO: 7.8 X10ˆ3/UL (ref 1.5–7.7)
NEUTROPHILS NFR BLD AUTO: 80.3 %
NITRITE UR QL STRIP: POSITIVE
PH UR STRIP: 5.5 [PH]
PLATELET # BLD AUTO: 274 X10ˆ3/UL (ref 150–450)
POTASSIUM BLD-SCNC: 3.6 MMOL/L (ref 3.6–5.1)
PROT UR STRIP-MCNC: >=300 MG/DL
RBC # BLD AUTO: 4.87 X10ˆ6/UL
SODIUM BLD-SCNC: 138 MMOL/L (ref 136–145)
SP GR UR STRIP: 1.02
UROBILINOGEN UR STRIP-ACNC: <2 MG/DL
WBC # BLD AUTO: 9.7 X10ˆ3/UL (ref 4–11)

## 2024-06-25 PROCEDURE — 96365 THER/PROPH/DIAG IV INF INIT: CPT

## 2024-06-25 PROCEDURE — 81025 URINE PREGNANCY TEST: CPT

## 2024-06-25 PROCEDURE — 99215 OFFICE O/P EST HI 40 MIN: CPT

## 2024-06-25 PROCEDURE — 96375 TX/PRO/DX INJ NEW DRUG ADDON: CPT

## 2024-06-25 PROCEDURE — 81002 URINALYSIS NONAUTO W/O SCOPE: CPT

## 2024-06-25 PROCEDURE — 85025 COMPLETE CBC W/AUTO DIFF WBC: CPT | Performed by: PHYSICIAN ASSISTANT

## 2024-06-25 PROCEDURE — 74176 CT ABD & PELVIS W/O CONTRAST: CPT | Performed by: PHYSICIAN ASSISTANT

## 2024-06-25 PROCEDURE — 87086 URINE CULTURE/COLONY COUNT: CPT | Performed by: PHYSICIAN ASSISTANT

## 2024-06-25 PROCEDURE — 96361 HYDRATE IV INFUSION ADD-ON: CPT

## 2024-06-25 PROCEDURE — 80047 BASIC METABLC PNL IONIZED CA: CPT

## 2024-06-25 RX ORDER — PHENAZOPYRIDINE HYDROCHLORIDE 100 MG/1
100 TABLET, FILM COATED ORAL 3 TIMES DAILY PRN
Qty: 6 TABLET | Refills: 0 | Status: SHIPPED | OUTPATIENT
Start: 2024-06-25 | End: 2024-07-02

## 2024-06-25 RX ORDER — SODIUM CHLORIDE 9 MG/ML
1000 INJECTION, SOLUTION INTRAVENOUS ONCE
Status: COMPLETED | OUTPATIENT
Start: 2024-06-25 | End: 2024-06-25

## 2024-06-25 RX ORDER — CEPHALEXIN 500 MG/1
500 CAPSULE ORAL 2 TIMES DAILY
Qty: 14 CAPSULE | Refills: 0 | Status: SHIPPED | OUTPATIENT
Start: 2024-06-25 | End: 2024-07-02

## 2024-06-25 RX ORDER — ONDANSETRON 2 MG/ML
4 INJECTION INTRAMUSCULAR; INTRAVENOUS ONCE
Status: COMPLETED | OUTPATIENT
Start: 2024-06-25 | End: 2024-06-25

## 2024-06-25 RX ORDER — NITROFURANTOIN 25; 75 MG/1; MG/1
100 CAPSULE ORAL 2 TIMES DAILY
Qty: 10 CAPSULE | Refills: 0 | Status: SHIPPED | OUTPATIENT
Start: 2024-06-25 | End: 2024-06-25

## 2024-06-25 NOTE — DISCHARGE INSTRUCTIONS
Drink at least 96 ounces of water daily. Limit caffeine intake.  Void after intercourse. No bubble baths, hot tubs, swimming while symptomatic.  We will call you only if your test results (urine culture) determines a need for a change in antibiotics.  Otherwise, take all your medications until completed.  See your doctor in 2-3 days if not better or if any back pain, fever, chills, nausea, or vomiting.

## 2024-06-25 NOTE — ED PROVIDER NOTES
Patient Seen in: [facility]      History     Chief Complaint   Patient presents with    Urinary Symptoms     Blood in urine, urge to pee - Entered by patient     Stated Complaint: Urinary Symptoms - Blood in urine, urge to pee    Subjective:   HPI    23-year-old female with known history of fibromyalgia, anxiety, asthma who comes in today with hematuria urgency frequency and dysuria and mild nausea.  Patient denies vomiting.  Denies abdominal pain but is having some mild right-sided flank pain.    Objective:   Past Medical History:    Anxiety    ASTHMA    Depression    ECZEMA      Fibromyalgia    Migraines    Traumatic brain injury (HCC)    Bad concussion              History reviewed. No pertinent surgical history.             Social History     Socioeconomic History    Marital status: Single   Tobacco Use    Smoking status: Never    Smokeless tobacco: Never   Vaping Use    Vaping status: Never Used   Substance and Sexual Activity    Alcohol use: Not Currently    Drug use: No              Review of Systems    Positive for stated Chief Complaint: Urinary Symptoms (Blood in urine, urge to pee - Entered by patient)    Other systems are as noted in HPI.  Constitutional and vital signs reviewed.      All other systems reviewed and negative except as noted above.    Physical Exam     ED Triage Vitals [06/25/24 0853]   /89   Pulse 90   Resp 18   Temp 97.4 °F (36.3 °C)   Temp src Temporal   SpO2 98 %   O2 Device None (Room air)       Current Vitals:   Vital Signs  BP: 128/89  Pulse: 90  Resp: 18  Temp: 97.4 °F (36.3 °C)  Temp src: Temporal    Oxygen Therapy  SpO2: 98 %  O2 Device: None (Room air)            Physical Exam    General Appearance:  Alert and orientated x 4, cooperative, no distress, appropriate for age  Head:  Normocephalic, atraumatic, without obvious abnormality  Neck:  Supple; symmetrical  Lungs:  Clear to auscultation bilaterally, respirations unlabored, no wheezing, rales or  rhonchi   Heart:  Regular rate & rhythm, S1 and S2 normal, no murmurs, rubs, or gallops  Abdomen:  Soft, mild suprapubic tenderness,  no mass or organomegaly. No rebound tenderness or guarding, negative CVA bilaterally                Skin/Hair/Nails:  Skin warm, dry and intact, no rashes or abnormal dyspigmentation  Neurologic:   Grossly intact, gait normal      ED Course     Labs Reviewed   POCT CBC - Abnormal; Notable for the following components:       Result Value    # Neutrophil 7.8 (*)     All other components within normal limits   [facility] POCT URINALYSIS DIPSTICK - Abnormal; Notable for the following components:    Urine Color Francheska (*)     Urine Clarity Turbid (*)     Protein urine >=300 (*)     Ketone, Urine 15 (*)     Bilirubin, Urine Moderate (*)     Blood, Urine Large (*)     Nitrite Urine Positive (*)     Leukocyte esterase urine Large (*)     All other components within normal limits   POCT PREGNANCY URINE - Normal   POCT ISTAT CHEM8 CARTRIDGE   URINE CULTURE, ROUTINE     CT ABDOMEN+PELVIS KIDNEYSTONE 2D RNDR(NO IV,NO ORAL)(CPT=74176)    Result Date: 6/25/2024  PROCEDURE:  CT ABDOMEN+PELVIS KIDNEYSTONE 2D RNDR(NO IV,NO ORAL)(CPT=74176)  COMPARISON:  [facility] , CT, CT APPENDIX ABD/PEL W CONTRAST (CPT=74177), 4/13/2023, 6:41 PM.  INDICATIONS:  Urinary Symptoms - Blood in urine, urge to pee  TECHNIQUE:  Unenhanced multislice CT scanning from above the kidneys to below the urinary bladder.  2D rendering are generated on the CT scanner workstation to localize potential stones in the cranio-caudal plane.  Dose reduction techniques were used. Dose information is transmitted to the ACR (American College of Radiology) NRDR (National Radiology Data Registry) which includes the Dose Index Registry.  PATIENT STATED HISTORY: (As transcribed by Technologist)  Patient states they woke up with a dull ache to the left flank, dysuria and gross hematuria.    FINDINGS:  KIDNEYS:  Normal renal morphology.  No nephrolithiasis,  ureteral calculi, or obstructive uropathy of either collecting system. BLADDER:  There may be mild concentric thickening of the urinary bladder.  No intraluminal calculi. ADRENALS:  No mass or enlargement.  LIVER:  No enlargement, atrophy, abnormal density, or significant focal lesion.  BILIARY:  No visible dilatation or calcification.  PANCREAS:  No lesion, fluid collection, ductal dilatation, or atrophy.  SPLEEN:  No enlargement or focal lesion.  AORTA/VASCULAR:  No aneurysm.  RETROPERITONEUM:  No mass or adenopathy.  BOWEL/MESENTERY:  No evidence of bowel obstruction.  Normal caliber appendix of the right lower quadrant contains multiple intraluminal fecaliths (series 3, image 154).  No surrounding fluid or inflammatory stranding.  The wall of the descending colon is  accentuated by incomplete distention (series 3, image 95).  Mild distal colonic stool volume. ABDOMINAL WALL:  No mass or hernia.  BONES:  No bony lesion or fracture. PELVIC ORGANS:  Normal for age.  LUNG BASES:  No visible pulmonary or pleural disease.  OTHER:  Negative.             CONCLUSION:   1. No nephrolithiasis, ureteral/bladder calculi, or obstructive uropathy of either collecting system.  2. There may be mild concentric thickening of the urinary bladder suggesting cystitis.  Recommend correlation with urinalysis.  3. Within the right lower quadrant is a normal caliber appendix with multiple intraluminal fecaliths.  No surrounding fluid or inflammatory stranding.  4. The wall of the descending colon is likely accentuated by under distention.  Mild colitis felt less likely, though recommend correlation with lower GI symptoms.    LOCATION:  [id number]   Dictated by (CST): Nani Eaton MD on 6/25/2024 at 10:42 AM     Finalized by (CST): Nani Eaton MD on 6/25/2024 at 10:48 AM             [facility]             Medical Decision Making  23-year-old female who comes in today complaining of urinary frequency urgency dysuria mild flank pain and also  hematuria    Problems Addressed:  Hemorrhagic cystitis: acute illness or injury    Amount and/or Complexity of Data Reviewed  Labs: ordered. Decision-making details documented in ED Course.     Details: UA: 300 protein, 15 of ketones, moderate bilirubin, large blood, positive nitrate, large leuks  Urine culture pending  Urine pregnancy negative  CBC normal white blood cell count, hemoglobin, platelet count  BMP normal  Radiology: ordered and independent interpretation performed. Decision-making details documented in ED Course.     Details: Personally reviewed the patient's CT stone protocol no evidence of acute nephrolithiasis, patient has signs and symptoms of acute cystitis  ECG/medicine tests: ordered and independent interpretation performed. Decision-making details documented in ED Course.     Details: IV saline, IV Rocephin     Risk  OTC drugs.  Prescription drug management.  Risk Details: Clinical impression: UTI  Differential diagnosis: Dysuria, urethritis, acute cystitis, pyelonephritis  Plan: The patient's urine culture is pending.  She will be started on keflex for 7 days.  She will be provided with prescription for Pyridium for symptomatic relief.  She is instructed to return if she develops nausea, vomiting, fever or back pain.  She will be contacted regarding the urine culture if her antibiotic needs to be changed.  The patient is in good condition throughout her visit today and remains so upon discharge.  I discuss the plan of care with the patient, who expresses understanding.  All questions and concerns are addressed to the patient's satisfaction prior to discharge today.          Disposition and Plan     Clinical Impression:  1. Hemorrhagic cystitis         Disposition:  Discharge  6/25/2024 11:30 am    Follow-up:  Priscilla Abebe DO  [address] [place] [id number]  [phone]    Schedule an appointment as soon as possible for a visit   If symptoms worsen          Medications  Prescribed:  Discharge Medication List as of 6/25/2024 12:08 PM        START taking these medications    Details   cephalexin 500 MG Oral Cap Take 1 capsule (500 mg total) by mouth 2 (two) times daily for 7 days., Normal, Disp-14 capsule, R-0      phenazopyridine 100 MG Oral Tab Take 1 tablet (100 mg total) by mouth 3 (three) times daily as needed for Pain., Normal, Disp-6 tablet, R-0           This report has been produced using speech recognition software and may contain errors related to that system including, but not limited to, errors in grammar, punctuation, and spelling, as well as words and phrases that possibly may have been recognized inappropriately.  If there are any questions or concerns, contact the dictating provider for clarification.     NOTE: The 21st Century Cares Act makes medical notes available to patients.  Be advised that this is a medical document written in medical language and may contain abbreviations or verbiage that is unfamiliar or direct.  It is primarily intended to carry relevant historical information, physical exam findings, and the clinical assessment of the physician.

## 2024-09-05 ENCOUNTER — LAB ENCOUNTER (OUTPATIENT)
Dept: LAB | Age: 24
End: 2024-09-05
Attending: PHYSICIAN ASSISTANT
Payer: COMMERCIAL

## 2024-09-05 ENCOUNTER — OFFICE VISIT (OUTPATIENT)
Dept: FAMILY MEDICINE CLINIC | Facility: CLINIC | Age: 24
End: 2024-09-05
Payer: COMMERCIAL

## 2024-09-05 VITALS
DIASTOLIC BLOOD PRESSURE: 82 MMHG | OXYGEN SATURATION: 98 % | BODY MASS INDEX: 24.17 KG/M2 | WEIGHT: 154 LBS | HEIGHT: 67 IN | SYSTOLIC BLOOD PRESSURE: 118 MMHG | HEART RATE: 78 BPM | RESPIRATION RATE: 16 BRPM

## 2024-09-05 DIAGNOSIS — Z00.00 ROUTINE GENERAL MEDICAL EXAMINATION AT A HEALTH CARE FACILITY: Primary | ICD-10-CM

## 2024-09-05 DIAGNOSIS — F41.9 ANXIETY: ICD-10-CM

## 2024-09-05 DIAGNOSIS — Z00.00 ROUTINE GENERAL MEDICAL EXAMINATION AT A HEALTH CARE FACILITY: ICD-10-CM

## 2024-09-05 DIAGNOSIS — R51.9 CHRONIC NONINTRACTABLE HEADACHE, UNSPECIFIED HEADACHE TYPE: ICD-10-CM

## 2024-09-05 DIAGNOSIS — J45.40 MODERATE PERSISTENT ASTHMA WITHOUT COMPLICATION (HCC): ICD-10-CM

## 2024-09-05 DIAGNOSIS — K58.8 OTHER IRRITABLE BOWEL SYNDROME: ICD-10-CM

## 2024-09-05 DIAGNOSIS — Z23 NEED FOR VACCINATION: ICD-10-CM

## 2024-09-05 DIAGNOSIS — M79.7 FIBROMYALGIA: ICD-10-CM

## 2024-09-05 DIAGNOSIS — G89.29 CHRONIC NONINTRACTABLE HEADACHE, UNSPECIFIED HEADACHE TYPE: ICD-10-CM

## 2024-09-05 DIAGNOSIS — N94.6 SEVERE MENSTRUAL CRAMPS: ICD-10-CM

## 2024-09-05 DIAGNOSIS — F90.2 ATTENTION DEFICIT HYPERACTIVITY DISORDER (ADHD), COMBINED TYPE: ICD-10-CM

## 2024-09-05 LAB
ALBUMIN SERPL-MCNC: 4.8 G/DL (ref 3.2–4.8)
ALBUMIN/GLOB SERPL: 2.1 {RATIO} (ref 1–2)
ALP LIVER SERPL-CCNC: 29 U/L
ALT SERPL-CCNC: 17 U/L
ANION GAP SERPL CALC-SCNC: 8 MMOL/L (ref 0–18)
AST SERPL-CCNC: 20 U/L (ref ?–34)
BASOPHILS # BLD AUTO: 0.03 X10(3) UL (ref 0–0.2)
BASOPHILS NFR BLD AUTO: 0.5 %
BILIRUB SERPL-MCNC: 0.3 MG/DL (ref 0.3–1.2)
BUN BLD-MCNC: 14 MG/DL (ref 9–23)
CALCIUM BLD-MCNC: 9.9 MG/DL (ref 8.7–10.4)
CHLORIDE SERPL-SCNC: 106 MMOL/L (ref 98–112)
CHOLEST SERPL-MCNC: 196 MG/DL (ref ?–200)
CO2 SERPL-SCNC: 27 MMOL/L (ref 21–32)
CREAT BLD-MCNC: 0.98 MG/DL
EGFRCR SERPLBLD CKD-EPI 2021: 83 ML/MIN/1.73M2 (ref 60–?)
EOSINOPHIL # BLD AUTO: 0.15 X10(3) UL (ref 0–0.7)
EOSINOPHIL NFR BLD AUTO: 2.7 %
ERYTHROCYTE [DISTWIDTH] IN BLOOD BY AUTOMATED COUNT: 12.4 %
FASTING PATIENT LIPID ANSWER: NO
FASTING STATUS PATIENT QL REPORTED: NO
GLOBULIN PLAS-MCNC: 2.3 G/DL (ref 2–3.5)
GLUCOSE BLD-MCNC: 88 MG/DL (ref 70–99)
HCT VFR BLD AUTO: 39.6 %
HDLC SERPL-MCNC: 65 MG/DL (ref 40–59)
HGB BLD-MCNC: 13.3 G/DL
IMM GRANULOCYTES # BLD AUTO: 0.01 X10(3) UL (ref 0–1)
IMM GRANULOCYTES NFR BLD: 0.2 %
LDLC SERPL CALC-MCNC: 118 MG/DL (ref ?–100)
LYMPHOCYTES # BLD AUTO: 1.71 X10(3) UL (ref 1–4)
LYMPHOCYTES NFR BLD AUTO: 30.9 %
MCH RBC QN AUTO: 30.2 PG (ref 26–34)
MCHC RBC AUTO-ENTMCNC: 33.6 G/DL (ref 31–37)
MCV RBC AUTO: 90 FL
MONOCYTES # BLD AUTO: 0.52 X10(3) UL (ref 0.1–1)
MONOCYTES NFR BLD AUTO: 9.4 %
NEUTROPHILS # BLD AUTO: 3.11 X10 (3) UL (ref 1.5–7.7)
NEUTROPHILS # BLD AUTO: 3.11 X10(3) UL (ref 1.5–7.7)
NEUTROPHILS NFR BLD AUTO: 56.3 %
NONHDLC SERPL-MCNC: 131 MG/DL (ref ?–130)
OSMOLALITY SERPL CALC.SUM OF ELEC: 292 MOSM/KG (ref 275–295)
PLATELET # BLD AUTO: 224 10(3)UL (ref 150–450)
POTASSIUM SERPL-SCNC: 4.2 MMOL/L (ref 3.5–5.1)
PROT SERPL-MCNC: 7.1 G/DL (ref 5.7–8.2)
RBC # BLD AUTO: 4.4 X10(6)UL
SODIUM SERPL-SCNC: 141 MMOL/L (ref 136–145)
TRIGL SERPL-MCNC: 73 MG/DL (ref 30–149)
TSI SER-ACNC: 1.17 MIU/ML (ref 0.55–4.78)
VIT B12 SERPL-MCNC: 321 PG/ML (ref 211–911)
VIT D+METAB SERPL-MCNC: 52 NG/ML (ref 30–100)
VLDLC SERPL CALC-MCNC: 13 MG/DL (ref 0–30)
WBC # BLD AUTO: 5.5 X10(3) UL (ref 4–11)

## 2024-09-05 PROCEDURE — 85025 COMPLETE CBC W/AUTO DIFF WBC: CPT | Performed by: PHYSICIAN ASSISTANT

## 2024-09-05 PROCEDURE — 82306 VITAMIN D 25 HYDROXY: CPT | Performed by: PHYSICIAN ASSISTANT

## 2024-09-05 PROCEDURE — 90715 TDAP VACCINE 7 YRS/> IM: CPT | Performed by: PHYSICIAN ASSISTANT

## 2024-09-05 PROCEDURE — 90677 PCV20 VACCINE IM: CPT | Performed by: PHYSICIAN ASSISTANT

## 2024-09-05 PROCEDURE — 90472 IMMUNIZATION ADMIN EACH ADD: CPT | Performed by: PHYSICIAN ASSISTANT

## 2024-09-05 PROCEDURE — 80061 LIPID PANEL: CPT | Performed by: PHYSICIAN ASSISTANT

## 2024-09-05 PROCEDURE — 90471 IMMUNIZATION ADMIN: CPT | Performed by: PHYSICIAN ASSISTANT

## 2024-09-05 PROCEDURE — 3079F DIAST BP 80-89 MM HG: CPT | Performed by: PHYSICIAN ASSISTANT

## 2024-09-05 PROCEDURE — 82607 VITAMIN B-12: CPT | Performed by: PHYSICIAN ASSISTANT

## 2024-09-05 PROCEDURE — 80053 COMPREHEN METABOLIC PANEL: CPT | Performed by: PHYSICIAN ASSISTANT

## 2024-09-05 PROCEDURE — 3074F SYST BP LT 130 MM HG: CPT | Performed by: PHYSICIAN ASSISTANT

## 2024-09-05 PROCEDURE — 99395 PREV VISIT EST AGE 18-39: CPT | Performed by: PHYSICIAN ASSISTANT

## 2024-09-05 PROCEDURE — 3008F BODY MASS INDEX DOCD: CPT | Performed by: PHYSICIAN ASSISTANT
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
## 2024-09-26 ENCOUNTER — HOSPITAL ENCOUNTER (OUTPATIENT)
Age: 24
Discharge: HOME | End: 2024-09-26
Payer: COMMERCIAL

## 2024-09-26 ENCOUNTER — APPOINTMENT (OUTPATIENT)
Dept: GENERAL RADIOLOGY | Age: 24
End: 2024-09-26
Attending: NURSE PRACTITIONER
Payer: COMMERCIAL

## 2024-09-26 VITALS
DIASTOLIC BLOOD PRESSURE: 78 MMHG | RESPIRATION RATE: 18 BRPM | TEMPERATURE: 97 F | SYSTOLIC BLOOD PRESSURE: 125 MMHG | OXYGEN SATURATION: 98 % | HEIGHT: 67 IN | WEIGHT: 156 LBS | BODY MASS INDEX: 24.48 KG/M2 | HEART RATE: 80 BPM

## 2024-09-26 DIAGNOSIS — J32.9 SINOBRONCHITIS: Primary | ICD-10-CM

## 2024-09-26 DIAGNOSIS — J40 SINOBRONCHITIS: Primary | ICD-10-CM

## 2024-09-26 PROCEDURE — 94640 AIRWAY INHALATION TREATMENT: CPT

## 2024-09-26 PROCEDURE — 99214 OFFICE O/P EST MOD 30 MIN: CPT

## 2024-09-26 PROCEDURE — 71046 X-RAY EXAM CHEST 2 VIEWS: CPT | Performed by: NURSE PRACTITIONER

## 2024-09-26 RX ORDER — ALBUTEROL SULFATE 0.83 MG/ML
2.5 SOLUTION RESPIRATORY (INHALATION) EVERY 4 HOURS PRN
Qty: 30 EACH | Refills: 0 | Status: SHIPPED | OUTPATIENT
Start: 2024-09-26 | End: 2024-10-26

## 2024-09-26 RX ORDER — IPRATROPIUM BROMIDE AND ALBUTEROL SULFATE 2.5; .5 MG/3ML; MG/3ML
3 SOLUTION RESPIRATORY (INHALATION) ONCE
Status: COMPLETED | OUTPATIENT
Start: 2024-09-26 | End: 2024-09-26

## 2024-09-26 RX ORDER — PREDNISONE 20 MG/1
40 TABLET ORAL DAILY
Qty: 10 TABLET | Refills: 0 | Status: SHIPPED | OUTPATIENT
Start: 2024-09-26 | End: 2024-10-01

## 2024-09-26 NOTE — ED PROVIDER NOTES
Patient Seen in: [facility]      History     Chief Complaint   Patient presents with    Cough     Sore throat, breathing issues - Entered by patient     Stated Complaint: Cough - Sore throat, breathing issues    Subjective:   HPI  24-year-old with anxiety, asthma, depression, eczema, fibromyalgia, migraines, and TBI presents complaining of 10 days of nasal congestion and a cough with wheezing.  No fever.  Patient teaches at a high school.    Objective:   Past Medical History:    Anxiety    ASTHMA    Depression    ECZEMA      Fibromyalgia    Migraines    Traumatic brain injury (HCC)    Bad concussion              History reviewed. No pertinent surgical history.             No pertinent social history.            Review of Systems   All other systems reviewed and are negative.      Positive for stated Chief Complaint: Cough (Sore throat, breathing issues - Entered by patient)    Other systems are as noted in HPI.  Constitutional and vital signs reviewed.      All other systems reviewed and negative except as noted above.    Physical Exam     ED Triage Vitals [09/26/24 1744]   /78   Pulse 80   Resp 18   Temp 97.1 °F (36.2 °C)   Temp src Temporal   SpO2 98 %   O2 Device None (Room air)       Current Vitals:   Vital Signs  BP: 125/78  Pulse: 80  Resp: 18  Temp: 97.1 °F (36.2 °C)  Temp src: Temporal    Oxygen Therapy  SpO2: 98 %  O2 Device: None (Room air)            Physical Exam  Vitals and nursing note reviewed.   Constitutional:       General: Patricia is not in acute distress.     Appearance: Patricia is well-developed. Patricia is not ill-appearing or toxic-appearing.   HENT:      Right Ear: Tympanic membrane, ear canal and external ear normal.      Left Ear: Tympanic membrane, ear canal and external ear normal.      Nose: Congestion present. No rhinorrhea.      Mouth/Throat:      Pharynx: No oropharyngeal exudate or posterior oropharyngeal erythema.   Cardiovascular:      Rate and Rhythm: Normal rate  and regular rhythm.      Heart sounds: Normal heart sounds.   Pulmonary:      Effort: Pulmonary effort is normal.      Breath sounds: Wheezing (expi expiratoryratory) present.   Skin:     General: Skin is warm and dry.   Neurological:      Mental Status: Patricia is alert and oriented to person, place, and time.               ED Course   Labs Reviewed - No data to display          XR CHEST PA + LAT CHEST (CPT=71046)    Result Date: 9/26/2024  PROCEDURE:  XR CHEST PA + LAT CHEST (CPT=71046)  INDICATIONS:  Cough - Sore throat, breathing issues  COMPARISON:  None.  TECHNIQUE:  PA and lateral chest radiographs were obtained.  PATIENT STATED HISTORY: (As transcribed by Technologist)  cough for ten days    FINDINGS:  LUNGS:  No focal consolidation.  Normal vascularity. CARDIAC:  Normal size cardiac silhouette. MEDIASTINUM:  Normal. PLEURA:  Normal.  No pleural effusions. BONES:  Normal for age.            CONCLUSION:  No acute cardiopulmonary process.   LOCATION:  [facility]   Dictated by (CST): Eve Arauz MD on 9/26/2024 at 6:17 PM     Finalized by (CST): Eev Arauz MD on 9/26/2024 at 6:17 PM             [facility]     Medical Decision Making  24-year-old with anxiety, asthma, depression, eczema, fibromyalgia, migraines, and TBI presents complaining of 10 days of nasal congestion and a cough with wheezing.  No fever.  Patient teaches at a high school.    Pertinent Labs & Imaging studies reviewed. (See chart for details).  Patient coming in with congestion and cough.   Differential diagnosis includes but not limited to sinusitis, bronchitis, pneumonia  Radiology chest x-ray with no acute cardiopulmonary process.  Will treat for sinobronchitis.  Will discharge on prednisone and Augmentin. Patient/Parent is comfortable with this plan.    Overall Pt looks good. Non-toxic, well-hydrated and in no respiratory distress. Vital signs are reassuring. Exam is reassuring. I do not believe pt requires and additional diagnostic studies or  intervention. I believe pt can be discharged home to continue evaluation as an outpatient. Follow-up provider given. Discharge instructions given and reviewed. Return for any problems. All understand and agree with the plan.    Lungs clear post DuoNeb.    Problems Addressed:  Sinobronchitis: acute illness or injury    Amount and/or Complexity of Data Reviewed  Radiology: ordered and independent interpretation performed.     Details: Chest x-ray ordered and independently interpreted by myself as no consolidation        Disposition and Plan     Clinical Impression:  1. Sinobronchitis         Disposition:  Discharge  9/26/2024  6:28 pm    Follow-up:  No follow-up provider specified.        Medications Prescribed:  Discharge Medication List as of 9/26/2024  6:29 PM        START taking these medications    Details   predniSONE 20 MG Oral Tab Take 2 tablets (40 mg total) by mouth daily for 5 days., Normal, Disp-10 tablet, R-0      amoxicillin clavulanate 875-125 MG Oral Tab Take 1 tablet by mouth 2 (two) times daily for 10 days., Normal, Disp-20 tablet, R-0

## 2024-11-04 ENCOUNTER — NURSE ONLY (OUTPATIENT)
Dept: FAMILY MEDICINE CLINIC | Facility: CLINIC | Age: 24
End: 2024-11-04
Payer: COMMERCIAL

## 2024-11-04 PROCEDURE — 90471 IMMUNIZATION ADMIN: CPT | Performed by: FAMILY MEDICINE

## 2024-11-04 PROCEDURE — 90656 IIV3 VACC NO PRSV 0.5 ML IM: CPT | Performed by: FAMILY MEDICINE

## 2025-02-19 ENCOUNTER — OFFICE VISIT (OUTPATIENT)
Dept: FAMILY MEDICINE CLINIC | Facility: CLINIC | Age: 25
End: 2025-02-19
Payer: COMMERCIAL

## 2025-02-19 ENCOUNTER — LAB ENCOUNTER (OUTPATIENT)
Dept: LAB | Age: 25
End: 2025-02-19
Attending: PHYSICIAN ASSISTANT
Payer: COMMERCIAL

## 2025-02-19 VITALS
WEIGHT: 171 LBS | HEIGHT: 67 IN | HEART RATE: 85 BPM | RESPIRATION RATE: 16 BRPM | OXYGEN SATURATION: 99 % | SYSTOLIC BLOOD PRESSURE: 112 MMHG | DIASTOLIC BLOOD PRESSURE: 62 MMHG | BODY MASS INDEX: 26.84 KG/M2

## 2025-02-19 DIAGNOSIS — L30.9 ECZEMA, UNSPECIFIED TYPE: ICD-10-CM

## 2025-02-19 DIAGNOSIS — R10.30 LOWER ABDOMINAL PAIN: ICD-10-CM

## 2025-02-19 DIAGNOSIS — N94.6 DYSMENORRHEA: Primary | ICD-10-CM

## 2025-02-19 LAB
CRP SERPL-MCNC: 0.4 MG/DL (ref ?–0.5)
ERYTHROCYTE [SEDIMENTATION RATE] IN BLOOD: 16 MM/HR

## 2025-02-19 PROCEDURE — 3008F BODY MASS INDEX DOCD: CPT | Performed by: PHYSICIAN ASSISTANT

## 2025-02-19 PROCEDURE — 85652 RBC SED RATE AUTOMATED: CPT | Performed by: PHYSICIAN ASSISTANT

## 2025-02-19 PROCEDURE — 86140 C-REACTIVE PROTEIN: CPT | Performed by: PHYSICIAN ASSISTANT

## 2025-02-19 PROCEDURE — 3078F DIAST BP <80 MM HG: CPT | Performed by: PHYSICIAN ASSISTANT

## 2025-02-19 PROCEDURE — 99214 OFFICE O/P EST MOD 30 MIN: CPT | Performed by: PHYSICIAN ASSISTANT

## 2025-02-19 PROCEDURE — 3074F SYST BP LT 130 MM HG: CPT | Performed by: PHYSICIAN ASSISTANT

## 2025-02-19 RX ORDER — CLOBETASOL PROPIONATE 0.5 MG/G
1 OINTMENT TOPICAL 2 TIMES DAILY
Qty: 60 G | Refills: 0 | Status: SHIPPED | OUTPATIENT
Start: 2025-02-19

## 2025-02-19 RX ORDER — PIMECROLIMUS 10 MG/G
1 CREAM TOPICAL 2 TIMES DAILY
Qty: 60 G | Refills: 1 | Status: SHIPPED | OUTPATIENT
Start: 2025-02-19

## 2025-02-19 NOTE — PROGRESS NOTES
Subjective:   Patient ID: Soo Moeller is a 24 year old adult.    HPI  Patient presents to discuss a few concerns:    We started a new OCP at the last visit  Doing better as far as periods go, bleeding is under control  Pain is a lot better  Having some nausea but manageable  Very happy on medication and would like to continue same    Also c/o abdominal pain after eating  No specific food triggers  Happens mainly after a normal amount of food (when eating until full), does not happen with smaller portions  No changes to bowel habits  Started happening about 6 weeks ago  Nothing changed overall to account for this  Pain located above the waistline  Lasts 30-60 minutes when present  Nausea comes after the pain, but not happening every episode  No bloating or gassiness   Goes away spontaneously   No exacerbating/remitting factors otherwise    Also c/o larger red eczema patch of the right arm in the flexural surface of the elbow  Topicals are all       History/Other:   Review of Systems   Constitutional:  Negative for chills, fatigue and fever.   HENT:  Negative for congestion, ear pain, rhinorrhea and sore throat.    Eyes:  Negative for visual disturbance.   Respiratory:  Negative for cough, shortness of breath and wheezing.    Cardiovascular:  Negative for chest pain, palpitations and leg swelling.   Gastrointestinal:  Positive for abdominal pain and nausea. Negative for abdominal distention, anal bleeding, blood in stool, constipation, diarrhea, rectal pain and vomiting.   Genitourinary:  Negative for dysuria, frequency, hematuria and menstrual problem.   Musculoskeletal:  Negative for arthralgias, gait problem and myalgias.   Skin:  Positive for rash (right elbow flexural surface).   Neurological:  Negative for weakness, light-headedness and headaches.   Hematological:  Negative for adenopathy.   Psychiatric/Behavioral:  Negative for dysphoric mood. The patient is not nervous/anxious.      Current  Outpatient Medications   Medication Sig Dispense Refill    atomoxetine 40 MG Oral Cap Take 1 capsule (40 mg total) by mouth in the morning and 1 capsule (40 mg total) before bedtime. 180 capsule 0    Norgestimate-Eth Estradiol (ESTARYLLA) 0.25-35 MG-MCG Oral Tab Take 1 tablet by mouth daily. 90 tablet 3    lamoTRIgine  MG Oral Tablet 24 Hr Take 1 tablet (100 mg total) by mouth in the morning and 1 tablet (100 mg total) before bedtime. 180 tablet 1    QUEtiapine 50 MG Oral Tab Take 1 tablet (50 mg total) by mouth nightly. 90 tablet 1    Ketorolac Tromethamine 10 MG Oral Tab Take 1 tablet (10 mg total) by mouth every 6 (six) hours as needed for Pain (severe menstrual cramps.). 15 tablet 2    fluticasone propionate 50 MCG/ACT Nasal Suspension 1 spray by Nasal route daily. 3 each 0    gabapentin 300 MG Oral Cap Take 1 capsule (300 mg total) by mouth in the morning and 1 capsule (300 mg total) before bedtime. 180 capsule 2    NURTEC 75 MG Oral Tablet Dispersible TAKE 1 TABLET ON OR UNDER THE TONGUE ONCE A DAY AS NEEDED.      traMADol 50 MG Oral Tab Take 1 tablet (50 mg total) by mouth every 6 (six) hours as needed for Pain. 30 tablet 0    naproxen 500 MG Oral Tab EC 1 tab PO BID x2 weeks, then 1 tab PO BID PRN pain. 60 tablet 0    ubrogepant 50 MG Oral Tab Take one pill at first onset of pain. Repeat dose in 2 hours if pain persists. Do not exceed 200 mg in 24 hours. Do not use for more than 5 migraines per month. 16 tablet 0    PREVIDENT 5000 BOOSTER PLUS 1.1 % Dental Paste Use as direct 100 mL 2    ketoconazole 2 % External Shampoo Massage thoroughly to scalp and leave 3-5 minutes before rinsing. Use every other wash. 120 mL 3    fluocinonide 0.05 % External Ointment       hydrocortisone 2.5 % External Ointment APPLY TO EYELID TWICE DAILY FOR NO MORE THAN 2 WEEKS AT A TIME      Nebulizer Does not apply Misc Dx: asthma wheezing 1 each 0    clindamycin 1 % External Lotion       albuterol 108 (90 Base) MCG/ACT  Inhalation Aero Soln Inhale into the lungs every 6 (six) hours as needed for Wheezing.      Magnesium 400 MG Oral Cap Take by mouth.      EPINEPHrine 0.3 MG/0.3ML Injection Solution Auto-injector Take 0.3 mL (1 each total) by mouth As Directed.      Melatonin 10 MG Oral Cap Take 15 mg by mouth.      Cholecalciferol (VITAMIN D3) 250 MCG (94034 UT) Oral Cap Take by mouth.      Fexofenadine HCl 180 MG Oral Tab Take 1 tablet (180 mg total) by mouth daily.      Fluticasone Furoate-Vilanterol (BREO ELLIPTA) 200-25 MCG/INH Inhalation Aerosol Powder, Breath Activated Inhale 1 puff into the lungs daily. 1 each 11     Allergies:Allergies[1]    Objective:   Physical Exam  Vitals and nursing note reviewed.   Constitutional:       Appearance: Patricia is well-developed.   HENT:      Head: Normocephalic and atraumatic.   Eyes:      Conjunctiva/sclera: Conjunctivae normal.      Pupils: Pupils are equal, round, and reactive to light.   Cardiovascular:      Rate and Rhythm: Normal rate and regular rhythm.      Heart sounds: Normal heart sounds.   Pulmonary:      Effort: Pulmonary effort is normal.      Breath sounds: Normal breath sounds. No wheezing or rales.   Abdominal:      General: Bowel sounds are normal. There is no distension.      Palpations: Abdomen is soft. Abdomen is not rigid. There is no mass.      Tenderness: There is abdominal tenderness (left abdomen/periumbilical region). There is no guarding or rebound.   Musculoskeletal:      Cervical back: Normal range of motion and neck supple.   Lymphadenopathy:      Cervical: No cervical adenopathy.   Skin:     General: Skin is warm and dry.      Findings: Erythema (large well demarcated patch of erythema and dryness of the right elbow flexural surface c/w eczema) present.   Neurological:      Mental Status: Patricia is alert.         Assessment & Plan:   1. Dysmenorrhea  Much better controlled on current OCP. Will continue same and send refills prn.     2. Lower abdominal  pain  Etiology unclear. Having lower abdominal symptoms after eating. On exam she has TTP of left periumbilical region but no masses, hernia, etc. Will check labs and follow up pending results. Recent CT scan reviewed. If labs are all normal then refer to GI for further evaluation and management. Recommend trial of low fodmap diet in the meantime.   - Calprotectin, Fecal; Future  - Sed Rate, Westergren (Automated)  - C-Reactive Protein  - Gastro Referral - In Network  - H. Pylori Stool Ag, EIA [E]; Future    3. Eczema, unspecified type  Refill of steroid cream and rx for pimecrolimus cream given to use BID x 2 weeks. Follow up if not improving or if symptoms worsen.   - clobetasol 0.05 % External Ointment; Apply 1 Application topically 2 (two) times daily.  Dispense: 60 g; Refill: 0  - pimecrolimus 1 % External Cream; Apply 1 Application topically 2 (two) times daily.  Dispense: 60 g; Refill: 1             [1]   Allergies  Allergen Reactions    Hazelnuts DIARRHEA, HIVES, NAUSEA AND VOMITING and Tightness in Throat    Sweet Potato HIVES, NAUSEA AND VOMITING and Tightness in Throat

## 2025-02-20 ENCOUNTER — LAB ENCOUNTER (OUTPATIENT)
Dept: LAB | Age: 25
End: 2025-02-20
Attending: PHYSICIAN ASSISTANT
Payer: COMMERCIAL

## 2025-02-20 DIAGNOSIS — R10.30 LOWER ABDOMINAL PAIN: ICD-10-CM

## 2025-02-20 PROCEDURE — 87338 HPYLORI STOOL AG IA: CPT

## 2025-02-20 PROCEDURE — 83993 ASSAY FOR CALPROTECTIN FECAL: CPT

## 2025-02-25 LAB
CALPROTECTIN STL-MCNT: 9.92 ΜG/G (ref ?–50)
H PYLORI AG STL QL IA: NEGATIVE

## 2025-03-05 ENCOUNTER — LAB ENCOUNTER (OUTPATIENT)
Dept: LAB | Age: 25
End: 2025-03-05
Attending: STUDENT IN AN ORGANIZED HEALTH CARE EDUCATION/TRAINING PROGRAM
Payer: COMMERCIAL

## 2025-03-05 DIAGNOSIS — K59.09 CHRONIC CONSTIPATION: ICD-10-CM

## 2025-03-05 DIAGNOSIS — R10.30 LOWER ABDOMINAL PAIN: ICD-10-CM

## 2025-03-05 LAB
ALBUMIN SERPL-MCNC: 4.7 G/DL (ref 3.2–4.8)
ALBUMIN/GLOB SERPL: 2 {RATIO} (ref 1–2)
ALP LIVER SERPL-CCNC: 32 U/L
ALT SERPL-CCNC: 18 U/L
ANION GAP SERPL CALC-SCNC: 8 MMOL/L (ref 0–18)
AST SERPL-CCNC: 22 U/L (ref ?–34)
BASOPHILS # BLD AUTO: 0.02 X10(3) UL (ref 0–0.2)
BASOPHILS NFR BLD AUTO: 0.4 %
BILIRUB SERPL-MCNC: 0.3 MG/DL (ref 0.3–1.2)
BUN BLD-MCNC: 15 MG/DL (ref 9–23)
CALCIUM BLD-MCNC: 10.1 MG/DL (ref 8.7–10.6)
CHLORIDE SERPL-SCNC: 104 MMOL/L (ref 98–112)
CO2 SERPL-SCNC: 28 MMOL/L (ref 21–32)
CREAT BLD-MCNC: 1.16 MG/DL
EGFRCR SERPLBLD CKD-EPI 2021: 67 ML/MIN/1.73M2 (ref 60–?)
EOSINOPHIL # BLD AUTO: 0.13 X10(3) UL (ref 0–0.7)
EOSINOPHIL NFR BLD AUTO: 2.6 %
ERYTHROCYTE [DISTWIDTH] IN BLOOD BY AUTOMATED COUNT: 12.4 %
FASTING STATUS PATIENT QL REPORTED: NO
GLOBULIN PLAS-MCNC: 2.3 G/DL (ref 2–3.5)
GLUCOSE BLD-MCNC: 86 MG/DL (ref 70–99)
HCT VFR BLD AUTO: 37.3 %
HGB BLD-MCNC: 12.5 G/DL
IMM GRANULOCYTES # BLD AUTO: 0.01 X10(3) UL (ref 0–1)
IMM GRANULOCYTES NFR BLD: 0.2 %
LYMPHOCYTES # BLD AUTO: 1.6 X10(3) UL (ref 1–4)
LYMPHOCYTES NFR BLD AUTO: 32.3 %
MCH RBC QN AUTO: 29.9 PG (ref 26–34)
MCHC RBC AUTO-ENTMCNC: 33.5 G/DL (ref 31–37)
MCV RBC AUTO: 89.2 FL
MONOCYTES # BLD AUTO: 0.44 X10(3) UL (ref 0.1–1)
MONOCYTES NFR BLD AUTO: 8.9 %
NEUTROPHILS # BLD AUTO: 2.76 X10 (3) UL (ref 1.5–7.7)
NEUTROPHILS # BLD AUTO: 2.76 X10(3) UL (ref 1.5–7.7)
NEUTROPHILS NFR BLD AUTO: 55.6 %
OSMOLALITY SERPL CALC.SUM OF ELEC: 290 MOSM/KG (ref 275–295)
PLATELET # BLD AUTO: 249 10(3)UL (ref 150–450)
POTASSIUM SERPL-SCNC: 4.1 MMOL/L (ref 3.5–5.1)
PROT SERPL-MCNC: 7 G/DL (ref 5.7–8.2)
RBC # BLD AUTO: 4.18 X10(6)UL
SODIUM SERPL-SCNC: 140 MMOL/L (ref 136–145)
TSI SER-ACNC: 0.96 UIU/ML (ref 0.55–4.78)
WBC # BLD AUTO: 5 X10(3) UL (ref 4–11)

## 2025-03-05 PROCEDURE — 84443 ASSAY THYROID STIM HORMONE: CPT

## 2025-03-05 PROCEDURE — 80053 COMPREHEN METABOLIC PANEL: CPT

## 2025-03-05 PROCEDURE — 85025 COMPLETE CBC W/AUTO DIFF WBC: CPT

## 2025-03-05 PROCEDURE — 36415 COLL VENOUS BLD VENIPUNCTURE: CPT

## 2025-03-10 NOTE — PROGRESS NOTES
Labs look great.  Complete CT scan, as scheduled.  Please call with any questions,    Juma Lynn MD

## 2025-03-12 ENCOUNTER — HOSPITAL ENCOUNTER (OUTPATIENT)
Dept: CT IMAGING | Age: 25
Discharge: HOME | End: 2025-03-12
Attending: STUDENT IN AN ORGANIZED HEALTH CARE EDUCATION/TRAINING PROGRAM
Payer: COMMERCIAL

## 2025-03-12 DIAGNOSIS — M79.7 FIBROMYALGIA: ICD-10-CM

## 2025-03-12 DIAGNOSIS — K59.09 CHRONIC CONSTIPATION: ICD-10-CM

## 2025-03-12 DIAGNOSIS — R10.30 LOWER ABDOMINAL PAIN: ICD-10-CM

## 2025-03-12 DIAGNOSIS — M94.0 COSTOCHONDRITIS: ICD-10-CM

## 2025-03-12 PROCEDURE — 74177 CT ABD & PELVIS W/CONTRAST: CPT | Performed by: STUDENT IN AN ORGANIZED HEALTH CARE EDUCATION/TRAINING PROGRAM

## 2025-03-12 RX ORDER — GABAPENTIN 300 MG/1
300 CAPSULE ORAL 2 TIMES DAILY
Qty: 120 CAPSULE | Refills: 0 | Status: SHIPPED | OUTPATIENT
Start: 2025-03-12

## 2025-03-12 RX ORDER — NORGESTIMATE AND ETHINYL ESTRADIOL 0.25-0.035
1 KIT ORAL DAILY
Qty: 90 TABLET | Refills: 3 | Status: CANCELLED | OUTPATIENT
Start: 2025-03-12 | End: 2026-03-12

## 2025-03-12 NOTE — TELEPHONE ENCOUNTER
Refill Request    LAST GFR: 03/05/2025; 67. PROTOCOL PASSED.     Medication request: gabapentin 300 MG Oral Cap. Take 1 capsule (300 mg total) by mouth in the morning and 1 capsule (300 mg total) before bedtime.     LOV: 02/01/2024 - Sarbjit Pacheco D.O.  Due back to clinic per last office note: Per Dr. Pacheco: \"Follow up in 6-12 months, or earlier as needed.\"  NOV: Visit date not found   MCM sent for patient to make follow up appointment.     [place] 12/16/2024 #180    Urine drug screen (if applicable): n/a  Pain contract: n/a    LOV plan (if weaning or changing medications): Per Dr. Pacheco: \"Continue gabapentin 600mg at bedtime.\"   This office note has been dictated.

## 2025-03-13 NOTE — PROGRESS NOTES
Patricia,    Great news, no evidence of colitis, tumor, lesions, or other abnormalities on the CT scan.  Complete the remainder of the plan, as outlined in the office visit.  Please call with any questions,    Juma Lynn MD

## 2025-03-18 ENCOUNTER — OFFICE VISIT (OUTPATIENT)
Dept: PHYSICAL MEDICINE AND REHAB | Facility: CLINIC | Age: 25
End: 2025-03-18
Payer: COMMERCIAL

## 2025-03-18 VITALS — WEIGHT: 162 LBS | BODY MASS INDEX: 25.43 KG/M2 | HEIGHT: 67 IN

## 2025-03-18 DIAGNOSIS — M79.7 FIBROMYALGIA: ICD-10-CM

## 2025-03-18 DIAGNOSIS — M94.0 COSTOCHONDRITIS: ICD-10-CM

## 2025-03-18 PROCEDURE — 3008F BODY MASS INDEX DOCD: CPT | Performed by: PHYSICAL MEDICINE & REHABILITATION

## 2025-03-18 PROCEDURE — 99213 OFFICE O/P EST LOW 20 MIN: CPT | Performed by: PHYSICAL MEDICINE & REHABILITATION

## 2025-03-18 RX ORDER — GABAPENTIN 600 MG/1
600 TABLET ORAL EVERY EVENING
Qty: 30 TABLET | Refills: 0 | Status: SHIPPED | OUTPATIENT
Start: 2025-03-18

## 2025-03-18 NOTE — PROGRESS NOTES
The following individual(s) verbally consented to be recorded using ambient AI listening technology and understand that they can each withdraw their consent to this listening technology at any point by asking the clinician to turn off or pause the recording:    Patient name: Soo Moeller  Additional names:

## 2025-03-18 NOTE — PROGRESS NOTES
The following individual(s) verbally consented to be recorded using ambient AI listening technology and understand that they can each withdraw their consent to this listening technology at any point by asking the clinician to turn off or pause the recording:    Patient name: Soo Moeller     Progress note    C/C:   Chief Complaint   Patient presents with    Follow - Up     LOV 2/1/24 patient following up on fibromyalgia pain. Continues gabapentin and naproxen and feels they have continued to be helpful, states they have been active this year and have been feeling good. Denies new sx. LOP 2/10        History of Present Illness  A 24-year-old patient with a history of fibromyalgia presents for a routine follow-up after a year. The patient reports a significant improvement in their condition, with fewer pain flares and increased mobility. They attribute this improvement to their active lifestyle, which includes walking five miles a day and working in a wood shop for three hours daily. The patient's pain, previously concentrated in the knees, has lessened, and they now experience what they describe as 'normal pain' rather than fibromyalgia-related pain. The patient continues to take gabapentin at night and uses naproxen intermittently, with the last use being a month ago due to a pain flare coinciding with menstrual cramps. The patient also takes ibuprofen daily and engages in regular hiking activities for exercise.    In addition to fibromyalgia, the patient also mentions a recent issue with stomach pain after eating a certain amount of food. This pain is described as a deeply set stomach pain, different from the high stomach pain associated with hyperacidity. The patient has undergone a CT scan and blood work for this issue, both of which came back normal.    Pertinent allergies: Allergies[1]     Ht 67\"   Wt 162 lb (73.5 kg)   LMP 01/16/2025   BMI 25.37 kg/m²      No ttp along the sternoclavicular joints,  or along the lateral borders of the superior manubrium b/l    bilateral knee exam  No palpable effusions b/l    Range of motion:  Flexion: 130 degrees, no pain  Extension: 0 degrees, no pain    Palpation:  Medial joint line tenderness: - b/l  Lateral joint line tenderness: - b/l  Medial patellar facet tenderness: - b/l  Lateral patellar facet tenderness: - b/l    Provocative tests:  Valgus and varus stress testing: - b/l  Assessment & Plan  Fibromyalgia    There is significant improvement with fewer pain flares and reduced naproxen use. Gabapentin 600 mg at night effectively manages pain, and they are open to tablet form. Prescribe gabapentin 600 mg tablets for one month; convert to 90 day supply with refills once it is clear what formulation is preferred. Advise naproxen 500mg as needed for severe pain flares. Overall doing quite well with regular aerobic activity, both recreationally (hiking on the weekends) and from a work standpoint (walks several miles per day during work)    Costochondritis    Rib pain flares are managed with compression garments and naproxen as needed. Advise continued use of compression garments and continue naproxen as needed for severe rib pain.    Follow-up    Their condition is well-managed with the current plan. Schedule follow-up in one year or earlier if needed.    Recording duration: 10 minutes    Follow up in 1 year, or earlier as needed.    Sarbjit Pacheco DO  Physical Medicine and Rehabilitation  [facility]       [1]   Allergies  Allergen Reactions    Hazelnuts DIARRHEA, HIVES, NAUSEA AND VOMITING and Tightness in Throat    Sweet Potato HIVES, NAUSEA AND VOMITING and Tightness in Throat

## 2025-03-19 ENCOUNTER — TELEPHONE (OUTPATIENT)
Dept: PHYSICAL MEDICINE AND REHAB | Facility: CLINIC | Age: 25
End: 2025-03-19

## 2025-03-20 NOTE — TELEPHONE ENCOUNTER
Called pharmacy and they wanted to know if patient is using the 300 mg or the 600 mg. Informed that patient will be using the 600 mg. Per pharmacist  since patient picked up the 300 mg on 3/17/25 and  the prescription was for 2 times a day. They will place the 600 mg prescription on file and patient can pick it up once she runs out of the 300 mg ones.     No further questions at this time. Closing the encounter.

## 2025-03-21 ENCOUNTER — APPOINTMENT (OUTPATIENT)
Dept: OCCUPATIONAL MEDICINE | Age: 25
End: 2025-03-21

## 2025-03-24 ENCOUNTER — APPOINTMENT (OUTPATIENT)
Dept: OCCUPATIONAL MEDICINE | Age: 25
End: 2025-03-24

## 2025-03-24 VITALS
HEIGHT: 57 IN | SYSTOLIC BLOOD PRESSURE: 114 MMHG | BODY MASS INDEX: 34.95 KG/M2 | DIASTOLIC BLOOD PRESSURE: 73 MMHG | WEIGHT: 162 LBS | HEART RATE: 92 BPM

## 2025-03-24 DIAGNOSIS — Z02.89 VISIT FOR OCCUPATIONAL HEALTH EXAMINATION: Primary | ICD-10-CM

## 2025-03-24 PROBLEM — M79.7 FIBROMYALGIA: Status: ACTIVE | Noted: 2025-03-24

## 2025-03-24 PROBLEM — S09.90XA SEVERE HEAD TRAUMA: Status: ACTIVE | Noted: 2025-03-24

## 2025-03-24 PROBLEM — F31.81 BIPOLAR 2 DISORDER  (CMD): Status: ACTIVE | Noted: 2025-03-24

## 2025-03-24 PROBLEM — G43.909 MIGRAINE: Status: ACTIVE | Noted: 2025-03-24

## 2025-03-24 PROBLEM — F41.9 ANXIETY: Status: ACTIVE | Noted: 2025-03-24

## 2025-03-24 PROBLEM — J45.909 ASTHMA (CMD): Status: ACTIVE | Noted: 2025-03-24

## 2025-03-24 LAB
APPEARANCE UR: CLEAR
BILIRUB UR QL STRIP: NEGATIVE MG/DL
COLOR UR: YELLOW
GLUCOSE UR-SCNC: NEGATIVE MMOL/L
KETONES UR QL STRIP: NEGATIVE MMOL/L
NITRITE UR STRIP-MCNC: NEGATIVE MG/DL
PH UR: 6 [PH]
PROT UR STRIP-MCNC: NEGATIVE G/L
RBC # UR STRIP: NEGATIVE ERY/UL
SP GR UR: 1.02
UROBILINOGEN UR-MCNC: 0.2 MG/DL
WBC # UR: ABNORMAL LEU/UL

## 2025-03-24 PROCEDURE — OH039 DOT DRUG SCREENS BUNDLED

## 2025-03-24 PROCEDURE — OH152 SCHOOL BUS DRIVER PHYSICAL

## 2025-03-24 NOTE — TELEPHONE ENCOUNTER
Pt.prefers mint flavor.    A refill request was received for:  Requested Prescriptions     Pending Prescriptions Disp Refills    PREVIDENT 5000 BOOSTER PLUS 1.1 % Dental Paste 100 mL 2     Sig: Use as direct       Last refill date:08/01/23       Last office visit: 02/19/25      Future Appointments   Date Time Provider Department Center   3/31/2025 10:30 AM Rebecca Garg APRN [facility]   4/4/2025 11:00 AM Angelia Jones LCPC [facility]

## 2025-03-26 RX ORDER — SODIUM FLUORIDE 6 MG/ML
PASTE, DENTIFRICE DENTAL
Qty: 100 ML | Refills: 2 | Status: SHIPPED | OUTPATIENT
Start: 2025-03-26

## 2025-05-24 DIAGNOSIS — M94.0 COSTOCHONDRITIS: ICD-10-CM

## 2025-05-24 DIAGNOSIS — M79.7 FIBROMYALGIA: ICD-10-CM

## 2025-05-28 RX ORDER — GABAPENTIN 600 MG/1
600 TABLET ORAL EVERY EVENING
Qty: 90 TABLET | Refills: 3 | Status: SHIPPED | OUTPATIENT
Start: 2025-05-28

## 2025-05-28 NOTE — TELEPHONE ENCOUNTER
Patient Comment: Could I get a 90 day supply through [facility] as a new prescription with refills?     Refill Request    Last GFR: 9/5/24 - 83 protocol passed    Medication request: gabapentin 600 MG Oral Tab Take 1 tablet (600 mg total) by mouth every evening.     LOV:3/18/2025 Sarbjit Pacheco, DO   Due back to clinic per last office note:  \"Follow up in 1 year, or earlier as needed. \"  NOV: Visit date not found      [place]: 4/17/25 #60 - 30 day supply    Urine drug screen (if applicable): n/a  Pain contract: n/a    LOV plan (if weaning or changing medications): Per  at last office visit: \"Gabapentin 600 mg at night effectively manages pain, and they are open to tablet form. Prescribe gabapentin 600 mg tablets for one month; convert to 90 day supply with refills once it is clear what formulation is preferred. \"

## 2025-08-13 RX ORDER — NORGESTIMATE AND ETHINYL ESTRADIOL 0.25-0.035
1 KIT ORAL DAILY
Qty: 84 TABLET | Refills: 0 | Status: SHIPPED | OUTPATIENT
Start: 2025-08-13

## (undated) NOTE — LETTER
09/13/21        [address] [facility] [place] [id number]      Dear Zayra Jruado,    [facility] records indicate that you have outstanding lab work and or testing that was ordered for you and has not yet been completed:  Orders Placed This Encounter

## (undated) NOTE — LETTER
Date & Time: 3/22/2018, 3:01 PM  Patient: Gianni May  Attending Provider:    Sincerely,    JEFFREY Newton         To Whom It May Concern:    Olga Mckee was seen and treated in our department on 3/22/2018.   She may not attend school on 3/23/201

## (undated) NOTE — LETTER
[facility]   Date:   2/1/2024     Name:   Soo Moeller    YOB: 2000   MRN:   [id number]       WHERE IS YOUR PAIN NOW?  Naga the areas on your body where you feel the described sensations.  Use the appropriate symbol.  Naga the areas of radiation.  Include all affected areas.  Just to complete the picture, please draw in the face.     ACHE:  ^ ^ ^   NUMBNESS:  0000   PINS & NEEDLES:  = = = =                              ^ ^ ^                       0000              = = = =                                    ^ ^ ^                       0000            = = = =      BURNING:  XXXX   STABBING: ////                  XXXX                ////                         XXXX          ////     Please naga the line below indicating your degree of pain right now  with 0 being no pain 10 being the worst pain possible.                                         0             1             2              3             4              5              6              7             8             9             10         Patient Signature:

## (undated) NOTE — LETTER
07/01/22        [address] [place] [address] [address]  [address] [facility] [address]      Dear Caden Mario,    [facility] records indicate that you have outstanding lab work and or testing that was ordered for you and has not yet been completed:  Orders Placed This Encounter      Hepatitis Panel, Acute (4) [E]      Lyme Disease, Total Ab W Rflx    To provide you with the best possible care, please complete these orders at your earliest convenience. If you have recently completed these orders please disregard this letter. If you have any questions please call the office at Dept: [phone].      Thank you,       Gokul Lisa PA-C

## (undated) NOTE — LETTER
[facility], Nannette Love, [place]   Date:   10/13/2023     Name:   Omar Davila    YOB: 2000   MRN:   [id number]       WHERE IS YOUR PAIN NOW? Naga the areas on your body where you feel the described sensations. Use the appropriate symbol. Camila Canal the areas of radiation. Include all affected areas. Just to complete the picture, please draw in the face. ACHE:  ^ ^ ^   NUMBNESS:  0000   PINS & NEEDLES:  = = = =                              ^ ^ ^                       0000              = = = =                                    ^ ^ ^                       0000            = = = =      BURNING:  XXXX   STABBING: ////                  XXXX                ////                         XXXX          ////     Please naga the line below indicating your degree of pain right now  with 0 being no pain 10 being the worst pain possible.                                          0             1             2              3             4              5              6              7             8             9             10         Patient Signature:

## (undated) NOTE — LETTER
Date: 8/8/2022    Patient Name: Tobias Kraft          To Whom it may concern:    Patricia Abdi is currently under my medical care. I am intimately familiar with her medical history and current conditions. She has fibromyalgia and chronic pain. She follows regularly with rheumatology, physiatry, and neurology. She participates in frequent PT/rehab sessions. She also experiences episodic flareups of pain which in the past have caused her to miss school. Please allow for absences when this happens. Episodes occur approximately 1-3 times per month, each lasting for 1 to 2 days at a time. Please also allow for late arrival to class should she have a prior PT/specialist appointment. Please contact our office if you require any additional information.         Sincerely,    Alexx Caban PA-C

## (undated) NOTE — LETTER
Date & Time: 3/22/2018, 3:00 PM  Patient: Melvina Diehl  Attending Provider:    Sincerely,    JEFFREY Wilde         To Whom It May Concern:    Macario Alicea was seen and treated in our department on 3/22/2018.   She may return to work on 3/26/2018 i

## (undated) NOTE — LETTER
07/01/22        [address] [facility] [address] [place] [address]      Dear Norma Dudley,    [facility] records indicate that you have outstanding lab work and or testing that was ordered for you and has not yet been completed:  Orders Placed This Encounter      Hepatitis Panel, Acute (4) [E]      Lyme Disease, Total Ab W Rflx    To provide you with the best possible care, please complete these orders at your earliest convenience. If you have recently completed these orders please disregard this letter. If you have any questions please call the office at Dept: [phone].      Thank you,       Hope Lowry PA-C

## (undated) NOTE — LETTER
ASTHMA ACTION PLAN for Soo Moeller     : 2000     Date: 24  Doctor:  Simeon Joseph PA-C  Phone for doctor or clinic: [facility], [address] [place] [id number] [phone]      ACT Score: 19    ACT Goal: 20 or greater    Call your provider if you require your rescue/quick reliever medication more than 2-3 times in a 24 hour period.    If you require your rescue inhaler/medication more than 2-3 times weekly, your asthma may not be under proper control and you should seek medical attention.    *Quick Relievers are Xopenex and Albuterol*    You can use the colors of a traffic light to help learn about your asthma medicines.  Year Round       1. Green - Go! % of Personal Best Peak Flow   Use controller medicine.   Breathing is good  No cough or wheeze  Can work and play Medicine How much to take When to take it    Medications       Sympathomimetics Instructions     albuterol 108 (90 Base) MCG/ACT Inhalation Aero Soln Inhale into the lungs every 6 (six) hours as needed for Wheezing.     Fluticasone Furoate-Vilanterol (BREO ELLIPTA) 200-25 MCG/INH Inhalation Aerosol Powder, Breath Activated Inhale 1 puff into the lungs daily.                    2. Yellow - Caution. 50-79% Personal Best Peak Flow  Use reliever medicine to keep an asthma attack from getting bad.   Cough  Quick Relievers  Wheezing  Tight Chest  Wake up at night Medicine How much to take When to take it    If symptoms are not improving in 24-48 hrs, call office for further instructions  Medications       Sympathomimetics Instructions     albuterol 108 (90 Base) MCG/ACT Inhalation Aero Soln Inhale into the lungs every 6 (six) hours as needed for Wheezing.     Fluticasone Furoate-Vilanterol (BREO ELLIPTA) 200-25 MCG/INH Inhalation Aerosol Powder, Breath Activated Inhale 1 puff into the lungs daily.                    3. Red - Stop! Danger! <50% Personal Best Peak  Flow  Continue Controller Medications But ADD:   Medicine not helping  Breathing is hard and fast  Nose opens wide  Can't walk  Ribs show  Can't talk well Medicine How much to take When to take it    If your symptoms do not improve in ONE hour -  go to the emergency room or call 911 immediately! If symptoms improve, call office for appointment immediately.    Albuterol inhaler 2 puffs every 20 minutes for three treatments       Don't forget:  Rinse mouth after using inhaler  Use spacer for inhaler  Remember to get your Flu vaccine every fall!    [x] Asthma Action Plan reviewed with the caregiver and patient, and a copy of the plan was given to the patient/caregiver.   [] Asthma Action Plan reviewed with the caregiver and patient on the phone, and copy mailed to patient/caregiver or sent via [employer].     Signatures:   Provider  Simeon Joseph PA-C Patient  Soo Hernandez Sajan Caretaker